# Patient Record
Sex: MALE | Race: WHITE | NOT HISPANIC OR LATINO | Employment: UNEMPLOYED | URBAN - NONMETROPOLITAN AREA
[De-identification: names, ages, dates, MRNs, and addresses within clinical notes are randomized per-mention and may not be internally consistent; named-entity substitution may affect disease eponyms.]

---

## 2022-08-20 ENCOUNTER — HOSPITAL ENCOUNTER (EMERGENCY)
Facility: HOSPITAL | Age: 57
Discharge: HOME | End: 2022-08-20
Attending: EMERGENCY MEDICINE | Admitting: EMERGENCY MEDICINE

## 2022-08-20 VITALS
TEMPERATURE: 98 F | WEIGHT: 165 LBS | RESPIRATION RATE: 16 BRPM | SYSTOLIC BLOOD PRESSURE: 108 MMHG | HEIGHT: 68 IN | HEART RATE: 68 BPM | DIASTOLIC BLOOD PRESSURE: 82 MMHG | OXYGEN SATURATION: 100 % | BODY MASS INDEX: 25.01 KG/M2

## 2022-08-20 DIAGNOSIS — F10.10 ALCOHOL ABUSE: Primary | ICD-10-CM

## 2022-08-20 LAB
ALBUMIN SERPL-MCNC: 3.2 G/DL (ref 3.5–5.2)
ALBUMIN/GLOB SERPL: 1.5 G/DL
ALP SERPL-CCNC: 203 U/L (ref 39–117)
ALT SERPL W P-5'-P-CCNC: 43 U/L (ref 1–41)
AMPHET+METHAMPHET UR QL: NEGATIVE
AMPHETAMINES UR QL: NEGATIVE
ANION GAP SERPL CALCULATED.3IONS-SCNC: 11.3 MMOL/L (ref 5–15)
AST SERPL-CCNC: 48 U/L (ref 1–40)
BACTERIA UR QL AUTO: ABNORMAL /HPF
BARBITURATES UR QL SCN: NEGATIVE
BASOPHILS # BLD AUTO: 0.1 10*3/MM3 (ref 0–0.2)
BASOPHILS NFR BLD AUTO: 0.9 % (ref 0–1.5)
BENZODIAZ UR QL SCN: POSITIVE
BILIRUB SERPL-MCNC: 0.6 MG/DL (ref 0–1.2)
BILIRUB UR QL STRIP: ABNORMAL
BUN SERPL-MCNC: 4 MG/DL (ref 6–20)
BUN/CREAT SERPL: 5 (ref 7–25)
BUPRENORPHINE SERPL-MCNC: NEGATIVE NG/ML
CALCIUM SPEC-SCNC: 8.6 MG/DL (ref 8.6–10.5)
CANNABINOIDS SERPL QL: POSITIVE
CHLORIDE SERPL-SCNC: 101 MMOL/L (ref 98–107)
CLARITY UR: ABNORMAL
CO2 SERPL-SCNC: 23.7 MMOL/L (ref 22–29)
COCAINE UR QL: NEGATIVE
COLOR UR: ABNORMAL
CREAT SERPL-MCNC: 0.8 MG/DL (ref 0.76–1.27)
DEPRECATED RDW RBC AUTO: 55.6 FL (ref 37–54)
EGFRCR SERPLBLD CKD-EPI 2021: 103.9 ML/MIN/1.73
EOSINOPHIL # BLD AUTO: 0.31 10*3/MM3 (ref 0–0.4)
EOSINOPHIL NFR BLD AUTO: 2.9 % (ref 0.3–6.2)
ERYTHROCYTE [DISTWIDTH] IN BLOOD BY AUTOMATED COUNT: 14.7 % (ref 12.3–15.4)
ETHANOL BLD-MCNC: <10 MG/DL (ref 0–10)
ETHANOL UR QL: <0.01 %
FLUAV RNA RESP QL NAA+PROBE: NOT DETECTED
FLUBV RNA RESP QL NAA+PROBE: NOT DETECTED
GLOBULIN UR ELPH-MCNC: 2.2 GM/DL
GLUCOSE SERPL-MCNC: 90 MG/DL (ref 65–99)
GLUCOSE UR STRIP-MCNC: NEGATIVE MG/DL
HCT VFR BLD AUTO: 43.8 % (ref 37.5–51)
HGB BLD-MCNC: 15 G/DL (ref 13–17.7)
HGB UR QL STRIP.AUTO: NEGATIVE
HOLD SPECIMEN: NORMAL
HOLD SPECIMEN: NORMAL
HYALINE CASTS UR QL AUTO: ABNORMAL /LPF
IMM GRANULOCYTES # BLD AUTO: 0.04 10*3/MM3 (ref 0–0.05)
IMM GRANULOCYTES NFR BLD AUTO: 0.4 % (ref 0–0.5)
KETONES UR QL STRIP: ABNORMAL
LEUKOCYTE ESTERASE UR QL STRIP.AUTO: ABNORMAL
LYMPHOCYTES # BLD AUTO: 2.78 10*3/MM3 (ref 0.7–3.1)
LYMPHOCYTES NFR BLD AUTO: 26.3 % (ref 19.6–45.3)
MAGNESIUM SERPL-MCNC: 1.6 MG/DL (ref 1.6–2.6)
MCH RBC QN AUTO: 34.6 PG (ref 26.6–33)
MCHC RBC AUTO-ENTMCNC: 34.2 G/DL (ref 31.5–35.7)
MCV RBC AUTO: 101.2 FL (ref 79–97)
METHADONE UR QL SCN: NEGATIVE
MONOCYTES # BLD AUTO: 0.73 10*3/MM3 (ref 0.1–0.9)
MONOCYTES NFR BLD AUTO: 6.9 % (ref 5–12)
NEUTROPHILS NFR BLD AUTO: 6.62 10*3/MM3 (ref 1.7–7)
NEUTROPHILS NFR BLD AUTO: 62.6 % (ref 42.7–76)
NITRITE UR QL STRIP: NEGATIVE
NRBC BLD AUTO-RTO: 0 /100 WBC (ref 0–0.2)
OPIATES UR QL: NEGATIVE
OXYCODONE UR QL SCN: NEGATIVE
PCP UR QL SCN: NEGATIVE
PH UR STRIP.AUTO: 6.5 [PH] (ref 5–8)
PLATELET # BLD AUTO: 264 10*3/MM3 (ref 140–450)
PMV BLD AUTO: 8.9 FL (ref 6–12)
POTASSIUM SERPL-SCNC: 3.6 MMOL/L (ref 3.5–5.2)
PROPOXYPH UR QL: NEGATIVE
PROT SERPL-MCNC: 5.4 G/DL (ref 6–8.5)
PROT UR QL STRIP: ABNORMAL
QT INTERVAL: 380 MS
QTC INTERVAL: 472 MS
RBC # BLD AUTO: 4.33 10*6/MM3 (ref 4.14–5.8)
RBC # UR STRIP: ABNORMAL /HPF
REF LAB TEST METHOD: ABNORMAL
SARS-COV-2 RNA RESP QL NAA+PROBE: NOT DETECTED
SODIUM SERPL-SCNC: 136 MMOL/L (ref 136–145)
SP GR UR STRIP: 1.01 (ref 1–1.03)
SQUAMOUS #/AREA URNS HPF: ABNORMAL /HPF
TRICYCLICS UR QL SCN: NEGATIVE
UROBILINOGEN UR QL STRIP: ABNORMAL
WBC # UR STRIP: ABNORMAL /HPF
WBC NRBC COR # BLD: 10.58 10*3/MM3 (ref 3.4–10.8)
WHOLE BLOOD HOLD COAG: NORMAL
WHOLE BLOOD HOLD SPECIMEN: NORMAL

## 2022-08-20 PROCEDURE — 81001 URINALYSIS AUTO W/SCOPE: CPT | Performed by: PHYSICIAN ASSISTANT

## 2022-08-20 PROCEDURE — 83735 ASSAY OF MAGNESIUM: CPT | Performed by: PHYSICIAN ASSISTANT

## 2022-08-20 PROCEDURE — 82077 ASSAY SPEC XCP UR&BREATH IA: CPT | Performed by: PHYSICIAN ASSISTANT

## 2022-08-20 PROCEDURE — 87636 SARSCOV2 & INF A&B AMP PRB: CPT | Performed by: PHYSICIAN ASSISTANT

## 2022-08-20 PROCEDURE — 80306 DRUG TEST PRSMV INSTRMNT: CPT | Performed by: PHYSICIAN ASSISTANT

## 2022-08-20 PROCEDURE — 93005 ELECTROCARDIOGRAM TRACING: CPT | Performed by: PHYSICIAN ASSISTANT

## 2022-08-20 PROCEDURE — C9803 HOPD COVID-19 SPEC COLLECT: HCPCS | Performed by: PHYSICIAN ASSISTANT

## 2022-08-20 PROCEDURE — 36415 COLL VENOUS BLD VENIPUNCTURE: CPT

## 2022-08-20 PROCEDURE — 99284 EMERGENCY DEPT VISIT MOD MDM: CPT

## 2022-08-20 PROCEDURE — 85025 COMPLETE CBC W/AUTO DIFF WBC: CPT | Performed by: PHYSICIAN ASSISTANT

## 2022-08-20 PROCEDURE — 80053 COMPREHEN METABOLIC PANEL: CPT | Performed by: PHYSICIAN ASSISTANT

## 2022-08-20 NOTE — NURSING NOTE
Assessment complete. History reviewed. Pt reports 3 month drinking binge, due to high stress situations. Pt had 4 years of sobriety prior to this relapse. CIWA 7 at this time. Pt reports a hx of DT during prior detox. Pt expressed depression 4/10 and anxiety 7/10. Denies SI/HI/AVH.    1.56

## 2022-08-20 NOTE — NURSING NOTE
Pt arrived to intake. Search complete with 2 staff members present. Pt educated on mask wearing policy. Belongings placed in labeled bag and placed in cabinet for safety.

## 2022-08-20 NOTE — ED PROVIDER NOTES
Subjective   56-year-old male who presents to the ED today for detox evaluation.  He states that he has been drinking alcohol heavily for the last 3 months.  He states prior to that he was sober for about 4 years.  He states his living situation and several deaths in the family triggered his relapse.  He reports that he has not been eating very much due to his alcohol binge.  He states he has been drinking up to 5 bottles of wine plus extra alcohol every day.  He states his last drink was yesterday morning.  He states currently he feels anxious but denies any other withdrawal symptoms.  He denies any drug use except some occasional marijuana.  He denies any suicidal ideations.      History provided by:  Patient  Drug / Alcohol Assessment  Primary symptoms comment: requesting detox. This is a new problem. The current episode started yesterday. The problem has been gradually worsening. Suspected agents include alcohol. Pertinent negatives include no nausea and no vomiting. Associated medical issues include addiction treatment.       Review of Systems   Constitutional: Positive for appetite change.   HENT: Negative.    Eyes: Negative.    Respiratory: Negative.    Cardiovascular: Negative.    Gastrointestinal: Negative for nausea and vomiting.   Genitourinary: Negative.    Musculoskeletal: Negative.    Skin: Negative.    Neurological: Negative.    Psychiatric/Behavioral: Negative for suicidal ideas. The patient is nervous/anxious.    All other systems reviewed and are negative.      Past Medical History:   Diagnosis Date   • Alcohol abuse    • Anxiety        No Known Allergies    History reviewed. No pertinent surgical history.    Family History   Family history unknown: Yes       Social History     Socioeconomic History   • Marital status: Single   Tobacco Use   • Smoking status: Current Every Day Smoker     Packs/day: 0.50     Types: Cigarettes   • Smokeless tobacco: Never Used   Substance and Sexual Activity   •  Alcohol use: Yes     Comment: 4-5 bottles of wine   • Sexual activity: Not Currently           Objective   Physical Exam  Vitals and nursing note reviewed.   Constitutional:       General: He is not in acute distress.     Appearance: Normal appearance. He is not diaphoretic.   HENT:      Head: Normocephalic and atraumatic.      Right Ear: External ear normal.      Left Ear: External ear normal.   Eyes:      Conjunctiva/sclera: Conjunctivae normal.      Pupils: Pupils are equal, round, and reactive to light.   Cardiovascular:      Rate and Rhythm: Normal rate and regular rhythm.      Pulses: Normal pulses.      Heart sounds: Normal heart sounds.   Pulmonary:      Effort: Pulmonary effort is normal.      Breath sounds: Normal breath sounds.   Abdominal:      General: Bowel sounds are normal.      Palpations: Abdomen is soft.   Musculoskeletal:         General: Normal range of motion.      Cervical back: Normal range of motion and neck supple.   Skin:     General: Skin is warm and dry.      Capillary Refill: Capillary refill takes less than 2 seconds.   Neurological:      General: No focal deficit present.      Mental Status: He is alert and oriented to person, place, and time.   Psychiatric:         Mood and Affect: Mood is anxious.         Speech: Speech normal.         Behavior: Behavior normal. Behavior is cooperative.         Thought Content: Thought content does not include homicidal or suicidal ideation.         Procedures           ED Course  ED Course as of 08/20/22 1531   Sat Aug 20, 2022   1333 Medically clear for detox [AH]   1335 ECG 12 Lead  Vent. Rate :  93 BPM     Atrial Rate :  93 BPM     P-R Int : 134 ms          QRS Dur :  86 ms      QT Int : 380 ms       P-R-T Axes :  36  28  50 degrees     QTc Int : 472 ms     Sinus rhythm with premature atrial complexes with aberrant conduction  Otherwise normal ECG  No previous ECGs available [ES]   1435 Dr. Simpson advised the patient does not meet inpatient  criteria at this time.  The patient will be discharged to follow-up outpatient. [AH]      ED Course User Index  [AH] Leatha Scott PA  [ES] Kj Rosa MD                                           MDM  Number of Diagnoses or Management Options     Amount and/or Complexity of Data Reviewed  Clinical lab tests: reviewed  Tests in the medicine section of CPT®: reviewed    Patient Progress  Patient progress: stable      Final diagnoses:   Alcohol abuse       ED Disposition  ED Disposition     ED Disposition   Discharge    Condition   Stable    Comment   --             PATIENT CONNECTION - [place]  See Provider List  [facility] [id number]  [phone]  Schedule an appointment as soon as possible for a visit in 2 days           Medication List      No changes were made to your prescriptions during this visit.          Leatha Scott PA  08/20/22 1536

## 2022-08-20 NOTE — NURSING NOTE
Spoke with Dr. Simpson regarding pt. Reviewed history, CIWA, labs, and vitals. Dr. Simpson does not recommend admission at this time. Advises to d/c with instructions to return of condition worsens.

## 2023-06-11 ENCOUNTER — HOSPITAL ENCOUNTER (EMERGENCY)
Facility: HOSPITAL | Age: 58
Discharge: PSYCHIATRIC OR SUBSTANCE USE FACILITY | End: 2023-06-11
Attending: STUDENT IN AN ORGANIZED HEALTH CARE EDUCATION/TRAINING PROGRAM | Admitting: STUDENT IN AN ORGANIZED HEALTH CARE EDUCATION/TRAINING PROGRAM
Payer: MEDICAID

## 2023-06-11 ENCOUNTER — HOSPITAL ENCOUNTER (INPATIENT)
Facility: HOSPITAL | Age: 58
LOS: 5 days | Discharge: HOME | End: 2023-06-16
Attending: PSYCHIATRY & NEUROLOGY | Admitting: PSYCHIATRY & NEUROLOGY
Payer: MEDICAID

## 2023-06-11 VITALS
BODY MASS INDEX: 25.76 KG/M2 | HEIGHT: 68 IN | RESPIRATION RATE: 18 BRPM | DIASTOLIC BLOOD PRESSURE: 79 MMHG | SYSTOLIC BLOOD PRESSURE: 111 MMHG | TEMPERATURE: 98 F | OXYGEN SATURATION: 96 % | WEIGHT: 170 LBS | HEART RATE: 107 BPM

## 2023-06-11 DIAGNOSIS — F10.10 ETOH ABUSE: Primary | ICD-10-CM

## 2023-06-11 LAB
ALBUMIN SERPL-MCNC: 4.2 G/DL (ref 3.5–5.2)
ALBUMIN/GLOB SERPL: 1.7 G/DL
ALP SERPL-CCNC: 109 U/L (ref 39–117)
ALT SERPL W P-5'-P-CCNC: 64 U/L (ref 1–41)
AMPHET+METHAMPHET UR QL: NEGATIVE
AMPHETAMINES UR QL: NEGATIVE
ANION GAP SERPL CALCULATED.3IONS-SCNC: 19.5 MMOL/L (ref 5–15)
AST SERPL-CCNC: 67 U/L (ref 1–40)
BACTERIA UR QL AUTO: ABNORMAL /HPF
BARBITURATES UR QL SCN: NEGATIVE
BASOPHILS # BLD AUTO: 0.04 10*3/MM3 (ref 0–0.2)
BASOPHILS NFR BLD AUTO: 0.6 % (ref 0–1.5)
BENZODIAZ UR QL SCN: NEGATIVE
BILIRUB SERPL-MCNC: 0.5 MG/DL (ref 0–1.2)
BILIRUB UR QL STRIP: NEGATIVE
BUN SERPL-MCNC: 2 MG/DL (ref 6–20)
BUN/CREAT SERPL: 2.9 (ref 7–25)
BUPRENORPHINE SERPL-MCNC: NEGATIVE NG/ML
CALCIUM SPEC-SCNC: 9 MG/DL (ref 8.6–10.5)
CANNABINOIDS SERPL QL: NEGATIVE
CHLORIDE SERPL-SCNC: 94 MMOL/L (ref 98–107)
CLARITY UR: ABNORMAL
CO2 SERPL-SCNC: 21.5 MMOL/L (ref 22–29)
COCAINE UR QL: NEGATIVE
COLOR UR: YELLOW
CREAT SERPL-MCNC: 0.7 MG/DL (ref 0.76–1.27)
DEPRECATED RDW RBC AUTO: 43.8 FL (ref 37–54)
EGFRCR SERPLBLD CKD-EPI 2021: 107.5 ML/MIN/1.73
EOSINOPHIL # BLD AUTO: 0.11 10*3/MM3 (ref 0–0.4)
EOSINOPHIL NFR BLD AUTO: 1.5 % (ref 0.3–6.2)
ERYTHROCYTE [DISTWIDTH] IN BLOOD BY AUTOMATED COUNT: 14 % (ref 12.3–15.4)
ETHANOL BLD-MCNC: 113 MG/DL (ref 0–10)
ETHANOL BLD-MCNC: 69 MG/DL (ref 0–10)
ETHANOL UR QL: 0.07 %
ETHANOL UR QL: 0.11 %
FLUAV RNA RESP QL NAA+PROBE: NOT DETECTED
FLUBV RNA RESP QL NAA+PROBE: NOT DETECTED
GLOBULIN UR ELPH-MCNC: 2.5 GM/DL
GLUCOSE SERPL-MCNC: 110 MG/DL (ref 65–99)
GLUCOSE UR STRIP-MCNC: NEGATIVE MG/DL
HCT VFR BLD AUTO: 46.3 % (ref 37.5–51)
HGB BLD-MCNC: 16.3 G/DL (ref 13–17.7)
HGB UR QL STRIP.AUTO: ABNORMAL
HYALINE CASTS UR QL AUTO: ABNORMAL /LPF
IMM GRANULOCYTES # BLD AUTO: 0.02 10*3/MM3 (ref 0–0.05)
IMM GRANULOCYTES NFR BLD AUTO: 0.3 % (ref 0–0.5)
KETONES UR QL STRIP: NEGATIVE
LEUKOCYTE ESTERASE UR QL STRIP.AUTO: NEGATIVE
LYMPHOCYTES # BLD AUTO: 1.67 10*3/MM3 (ref 0.7–3.1)
LYMPHOCYTES NFR BLD AUTO: 23 % (ref 19.6–45.3)
MAGNESIUM SERPL-MCNC: 2.1 MG/DL (ref 1.6–2.6)
MCH RBC QN AUTO: 30.7 PG (ref 26.6–33)
MCHC RBC AUTO-ENTMCNC: 35.2 G/DL (ref 31.5–35.7)
MCV RBC AUTO: 87.2 FL (ref 79–97)
METHADONE UR QL SCN: NEGATIVE
MONOCYTES # BLD AUTO: 0.98 10*3/MM3 (ref 0.1–0.9)
MONOCYTES NFR BLD AUTO: 13.5 % (ref 5–12)
NEUTROPHILS NFR BLD AUTO: 4.45 10*3/MM3 (ref 1.7–7)
NEUTROPHILS NFR BLD AUTO: 61.1 % (ref 42.7–76)
NITRITE UR QL STRIP: NEGATIVE
NRBC BLD AUTO-RTO: 0 /100 WBC (ref 0–0.2)
OPIATES UR QL: NEGATIVE
OXYCODONE UR QL SCN: NEGATIVE
PCP UR QL SCN: NEGATIVE
PH UR STRIP.AUTO: 6.5 [PH] (ref 5–8)
PLATELET # BLD AUTO: 168 10*3/MM3 (ref 140–450)
PMV BLD AUTO: 8.8 FL (ref 6–12)
POTASSIUM SERPL-SCNC: 3.5 MMOL/L (ref 3.5–5.2)
PROPOXYPH UR QL: NEGATIVE
PROT SERPL-MCNC: 6.7 G/DL (ref 6–8.5)
PROT UR QL STRIP: NEGATIVE
RBC # BLD AUTO: 5.31 10*6/MM3 (ref 4.14–5.8)
RBC # UR STRIP: ABNORMAL /HPF
REF LAB TEST METHOD: ABNORMAL
SARS-COV-2 RNA RESP QL NAA+PROBE: NOT DETECTED
SODIUM SERPL-SCNC: 135 MMOL/L (ref 136–145)
SP GR UR STRIP: 1.01 (ref 1–1.03)
SQUAMOUS #/AREA URNS HPF: ABNORMAL /HPF
TRICYCLICS UR QL SCN: NEGATIVE
UROBILINOGEN UR QL STRIP: ABNORMAL
WBC # UR STRIP: ABNORMAL /HPF
WBC NRBC COR # BLD: 7.27 10*3/MM3 (ref 3.4–10.8)

## 2023-06-11 PROCEDURE — 93005 ELECTROCARDIOGRAM TRACING: CPT | Performed by: PSYCHIATRY & NEUROLOGY

## 2023-06-11 PROCEDURE — 82077 ASSAY SPEC XCP UR&BREATH IA: CPT | Performed by: PHYSICIAN ASSISTANT

## 2023-06-11 PROCEDURE — 99285 EMERGENCY DEPT VISIT HI MDM: CPT

## 2023-06-11 PROCEDURE — 80306 DRUG TEST PRSMV INSTRMNT: CPT | Performed by: PHYSICIAN ASSISTANT

## 2023-06-11 PROCEDURE — 85025 COMPLETE CBC W/AUTO DIFF WBC: CPT | Performed by: PHYSICIAN ASSISTANT

## 2023-06-11 PROCEDURE — 87636 SARSCOV2 & INF A&B AMP PRB: CPT | Performed by: PHYSICIAN ASSISTANT

## 2023-06-11 PROCEDURE — 83735 ASSAY OF MAGNESIUM: CPT | Performed by: PHYSICIAN ASSISTANT

## 2023-06-11 PROCEDURE — 81001 URINALYSIS AUTO W/SCOPE: CPT | Performed by: PHYSICIAN ASSISTANT

## 2023-06-11 PROCEDURE — 36415 COLL VENOUS BLD VENIPUNCTURE: CPT

## 2023-06-11 PROCEDURE — 80053 COMPREHEN METABOLIC PANEL: CPT | Performed by: PHYSICIAN ASSISTANT

## 2023-06-11 RX ORDER — LORAZEPAM 2 MG/1
2 TABLET ORAL
Status: DISCONTINUED | OUTPATIENT
Start: 2023-06-11 | End: 2023-06-16 | Stop reason: HOSPADM

## 2023-06-11 RX ORDER — MULTIPLE VITAMINS W/ MINERALS TAB 9MG-400MCG
1 TAB ORAL DAILY
Status: DISCONTINUED | OUTPATIENT
Start: 2023-06-11 | End: 2023-06-16 | Stop reason: HOSPADM

## 2023-06-11 RX ORDER — ECHINACEA PURPUREA EXTRACT 125 MG
2 TABLET ORAL AS NEEDED
Status: DISCONTINUED | OUTPATIENT
Start: 2023-06-11 | End: 2023-06-16 | Stop reason: HOSPADM

## 2023-06-11 RX ORDER — BENZONATATE 100 MG/1
100 CAPSULE ORAL 3 TIMES DAILY PRN
Status: DISCONTINUED | OUTPATIENT
Start: 2023-06-11 | End: 2023-06-16 | Stop reason: HOSPADM

## 2023-06-11 RX ORDER — ONDANSETRON 4 MG/1
4 TABLET, FILM COATED ORAL EVERY 6 HOURS PRN
Status: DISCONTINUED | OUTPATIENT
Start: 2023-06-11 | End: 2023-06-16 | Stop reason: HOSPADM

## 2023-06-11 RX ORDER — LORAZEPAM 1 MG/1
1 TABLET ORAL
Status: COMPLETED | OUTPATIENT
Start: 2023-06-14 | End: 2023-06-14

## 2023-06-11 RX ORDER — LORAZEPAM 2 MG/1
2 TABLET ORAL
Status: COMPLETED | OUTPATIENT
Start: 2023-06-12 | End: 2023-06-12

## 2023-06-11 RX ORDER — SODIUM CHLORIDE 0.9 % (FLUSH) 0.9 %
10 SYRINGE (ML) INJECTION AS NEEDED
Status: DISCONTINUED | OUTPATIENT
Start: 2023-06-11 | End: 2023-06-11 | Stop reason: HOSPADM

## 2023-06-11 RX ORDER — ALUMINA, MAGNESIA, AND SIMETHICONE 2400; 2400; 240 MG/30ML; MG/30ML; MG/30ML
15 SUSPENSION ORAL EVERY 6 HOURS PRN
Status: DISCONTINUED | OUTPATIENT
Start: 2023-06-11 | End: 2023-06-16 | Stop reason: HOSPADM

## 2023-06-11 RX ORDER — LORAZEPAM 2 MG/1
2 TABLET ORAL
Status: DISCONTINUED | OUTPATIENT
Start: 2023-06-11 | End: 2023-06-11

## 2023-06-11 RX ORDER — LORAZEPAM 0.5 MG/1
0.5 TABLET ORAL
Status: COMPLETED | OUTPATIENT
Start: 2023-06-15 | End: 2023-06-15

## 2023-06-11 RX ORDER — FAMOTIDINE 20 MG/1
20 TABLET, FILM COATED ORAL 2 TIMES DAILY PRN
Status: DISCONTINUED | OUTPATIENT
Start: 2023-06-11 | End: 2023-06-16 | Stop reason: HOSPADM

## 2023-06-11 RX ORDER — LORAZEPAM 2 MG/ML
2 INJECTION INTRAMUSCULAR ONCE AS NEEDED
Status: DISCONTINUED | OUTPATIENT
Start: 2023-06-11 | End: 2023-06-16 | Stop reason: HOSPADM

## 2023-06-11 RX ORDER — MULTIVITAMIN WITH IRON
2 TABLET ORAL DAILY
Status: DISCONTINUED | OUTPATIENT
Start: 2023-06-11 | End: 2023-06-16 | Stop reason: HOSPADM

## 2023-06-11 RX ORDER — IBUPROFEN 400 MG/1
400 TABLET ORAL EVERY 6 HOURS PRN
Status: DISCONTINUED | OUTPATIENT
Start: 2023-06-11 | End: 2023-06-16 | Stop reason: HOSPADM

## 2023-06-11 RX ORDER — BENZTROPINE MESYLATE 1 MG/1
2 TABLET ORAL ONCE AS NEEDED
Status: DISCONTINUED | OUTPATIENT
Start: 2023-06-11 | End: 2023-06-16 | Stop reason: HOSPADM

## 2023-06-11 RX ORDER — LORAZEPAM 2 MG/1
2 TABLET ORAL ONCE
Status: COMPLETED | OUTPATIENT
Start: 2023-06-11 | End: 2023-06-11

## 2023-06-11 RX ORDER — LORAZEPAM 2 MG/1
2 TABLET ORAL CONTINUOUS PRN
Status: DISCONTINUED | OUTPATIENT
Start: 2023-06-11 | End: 2023-06-11

## 2023-06-11 RX ORDER — HYDROXYZINE 50 MG/1
50 TABLET, FILM COATED ORAL EVERY 6 HOURS PRN
Status: DISCONTINUED | OUTPATIENT
Start: 2023-06-11 | End: 2023-06-16 | Stop reason: HOSPADM

## 2023-06-11 RX ORDER — BENZTROPINE MESYLATE 1 MG/ML
1 INJECTION INTRAMUSCULAR; INTRAVENOUS ONCE AS NEEDED
Status: DISCONTINUED | OUTPATIENT
Start: 2023-06-11 | End: 2023-06-16 | Stop reason: HOSPADM

## 2023-06-11 RX ORDER — LOPERAMIDE HYDROCHLORIDE 2 MG/1
2 CAPSULE ORAL
Status: DISCONTINUED | OUTPATIENT
Start: 2023-06-11 | End: 2023-06-16 | Stop reason: HOSPADM

## 2023-06-11 RX ORDER — TRAZODONE HYDROCHLORIDE 50 MG/1
50 TABLET ORAL NIGHTLY PRN
Status: DISCONTINUED | OUTPATIENT
Start: 2023-06-11 | End: 2023-06-16 | Stop reason: HOSPADM

## 2023-06-11 RX ORDER — LORAZEPAM 1 MG/1
1 TABLET ORAL EVERY 4 HOURS PRN
Status: ACTIVE | OUTPATIENT
Start: 2023-06-14 | End: 2023-06-15

## 2023-06-11 RX ORDER — LORAZEPAM 0.5 MG/1
0.5 TABLET ORAL EVERY 4 HOURS PRN
Status: ACTIVE | OUTPATIENT
Start: 2023-06-15 | End: 2023-06-16

## 2023-06-11 RX ORDER — LORAZEPAM 2 MG/1
2 TABLET ORAL EVERY 4 HOURS PRN
Status: ACTIVE | OUTPATIENT
Start: 2023-06-12 | End: 2023-06-13

## 2023-06-11 RX ORDER — NICOTINE 21 MG/24HR
1 PATCH, TRANSDERMAL 24 HOURS TRANSDERMAL
Status: DISCONTINUED | OUTPATIENT
Start: 2023-06-11 | End: 2023-06-16 | Stop reason: HOSPADM

## 2023-06-11 RX ADMIN — LORAZEPAM 2 MG: 2 TABLET ORAL at 22:21

## 2023-06-11 RX ADMIN — LORAZEPAM 2 MG: 2 TABLET ORAL at 20:07

## 2023-06-11 RX ADMIN — Medication 1 TABLET: at 20:09

## 2023-06-11 RX ADMIN — LORAZEPAM 2 MG: 2 TABLET ORAL at 16:36

## 2023-06-11 RX ADMIN — LORAZEPAM 2 MG: 2 TABLET ORAL at 23:53

## 2023-06-11 RX ADMIN — LORAZEPAM 2 MG: 2 TABLET ORAL at 21:17

## 2023-06-11 RX ADMIN — Medication 2 TABLET: at 20:08

## 2023-06-11 RX ADMIN — Medication 100 MG: at 20:08

## 2023-06-11 RX ADMIN — NICOTINE TRANSDERMAL SYSTEM 1 PATCH: 21 PATCH, EXTENDED RELEASE TRANSDERMAL at 20:08

## 2023-06-11 RX ADMIN — SODIUM CHLORIDE 1000 ML: 9 INJECTION, SOLUTION INTRAVENOUS at 13:12

## 2023-06-11 RX ADMIN — HYDROXYZINE HYDROCHLORIDE 50 MG: 50 TABLET ORAL at 20:07

## 2023-06-11 RX ADMIN — LORAZEPAM 2 MG: 2 TABLET ORAL at 21:47

## 2023-06-11 NOTE — NURSING NOTE
Spoke to doctor Ardon intake information labs and V/S provided and discussed with provider, instructed to admit with routine SP4 orders, Ativan detox orders, Ativan protocol med to every 1 hour as needed, Ativan 2mg IV PRN for seizures as needed, repeat in 1 minute if seizures do not cease, if IV access unobtainable give IM [id number]. Patient and ER provider made aware of admitting orders and plan of care.

## 2023-06-11 NOTE — NURSING NOTE
Pt presents for alcohol detox, two fifths of liquor daily along with beer and wine, for the last month, last drink was today. States relapsed 1 month ago      Pt denies SI HI AVH.    Reports seizure approx. 3 years ago.    CIWA-8    Craving-0/10    Pt plans to return to [facility] after detox.    Reports poor sleep and appetite.    Depression-9/10  Anxiety-9/10    Denies any other substance or alcohol abuse.

## 2023-06-11 NOTE — ED PROVIDER NOTES
Subjective   History of Present Illness  57-year-old male that presents to the emergency department chief complaint alcohol abuse.  Patient does state he drank 1 or 2 beers prior to arrival.  Patient does state he drinks daily.  Does wish to go through medical detox.  Patient denies any suicidal homicidal ideation at this time.    History provided by:  Patient   used: No    Addiction Problem  Location:  Alcohol  Severity:  Moderate  Onset quality:  Gradual  Duration:  2 days  Timing:  Intermittent  Progression:  Worsening  Chronicity:  New  Associated symptoms: no chest pain, no cough, no diarrhea, no ear pain, no fever, no headaches, no myalgias, no rash, no rhinorrhea, no shortness of breath, no sore throat, no vomiting and no wheezing      Review of Systems   Constitutional: Negative.  Negative for appetite change and fever.   HENT: Negative.  Negative for ear pain, rhinorrhea and sore throat.    Eyes: Negative.  Negative for photophobia, pain and itching.   Respiratory:  Negative for cough, shortness of breath and wheezing.    Cardiovascular: Negative.  Negative for chest pain.   Gastrointestinal: Negative.  Negative for diarrhea and vomiting.   Endocrine: Negative.  Negative for heat intolerance, polydipsia and polyuria.   Genitourinary: Negative.  Negative for dysuria, frequency, genital sores and hematuria.   Musculoskeletal: Negative.  Negative for myalgias.   Skin: Negative.  Negative for pallor, rash and wound.   Neurological:  Negative for headaches.   Hematological: Negative.    Psychiatric/Behavioral: Negative.  Negative for confusion, decreased concentration, dysphoric mood and hallucinations. The patient is not nervous/anxious.    All other systems reviewed and are negative.    Past Medical History:   Diagnosis Date    Alcohol abuse     Anxiety        No Known Allergies    No past surgical history on file.    Family History   Family history unknown: Yes       Social History      Socioeconomic History    Marital status: Single   Tobacco Use    Smoking status: Every Day     Packs/day: 0.50     Types: Cigarettes    Smokeless tobacco: Never   Substance and Sexual Activity    Alcohol use: Yes     Comment: 4-5 bottles of wine    Sexual activity: Not Currently           Objective   Physical Exam  Vitals reviewed.   Constitutional:       General: He is not in acute distress.     Appearance: Normal appearance. He is normal weight. He is not ill-appearing, toxic-appearing or diaphoretic.   HENT:      Head: Normocephalic and atraumatic.      Right Ear: Tympanic membrane, ear canal and external ear normal. There is no impacted cerumen.      Left Ear: Tympanic membrane, ear canal and external ear normal. There is no impacted cerumen.      Nose: Nose normal. No congestion or rhinorrhea.      Mouth/Throat:      Mouth: Mucous membranes are moist.      Pharynx: Oropharynx is clear. No oropharyngeal exudate or posterior oropharyngeal erythema.   Eyes:      General: No scleral icterus.        Right eye: No discharge.         Left eye: No discharge.      Extraocular Movements: Extraocular movements intact.      Conjunctiva/sclera: Conjunctivae normal.      Pupils: Pupils are equal, round, and reactive to light.   Neck:      Vascular: No carotid bruit.   Cardiovascular:      Rate and Rhythm: Normal rate and regular rhythm.      Pulses: Normal pulses.      Heart sounds: Normal heart sounds. No murmur heard.    No friction rub. No gallop.   Pulmonary:      Effort: Pulmonary effort is normal. No respiratory distress.      Breath sounds: Normal breath sounds. No stridor. No wheezing or rales.   Chest:      Chest wall: No tenderness.   Abdominal:      General: Abdomen is flat. Bowel sounds are normal. There is no distension.      Palpations: Abdomen is soft. There is no mass.      Tenderness: There is no abdominal tenderness. There is no right CVA tenderness, guarding or rebound.      Hernia: No hernia is  present.   Musculoskeletal:         General: No swelling, tenderness, deformity or signs of injury. Normal range of motion.      Cervical back: Normal range of motion and neck supple. No rigidity or tenderness.      Right lower leg: No edema.      Left lower leg: No edema.   Lymphadenopathy:      Cervical: No cervical adenopathy.   Skin:     General: Skin is warm and dry.      Capillary Refill: Capillary refill takes less than 2 seconds.      Coloration: Skin is not jaundiced or pale.      Findings: No bruising, erythema or lesion.   Neurological:      General: No focal deficit present.      Mental Status: He is alert and oriented to person, place, and time. Mental status is at baseline.      Cranial Nerves: No cranial nerve deficit.      Sensory: No sensory deficit.      Motor: No weakness.      Coordination: Coordination normal.      Gait: Gait normal.   Psychiatric:         Mood and Affect: Mood normal.         Behavior: Behavior normal.         Thought Content: Thought content normal.         Judgment: Judgment normal.       Procedures           ED Course                                           Medical Decision Making  Problems Addressed:  ETOH abuse: complicated acute illness or injury    Amount and/or Complexity of Data Reviewed  Labs: ordered.    Risk  Prescription drug management.        Final diagnoses:   ETOH abuse       ED Disposition  ED Disposition       ED Disposition   DC/Transfer to Behavioral Health Condition   Stable    Comment   --               No follow-up provider specified.       Medication List      No changes were made to your prescriptions during this visit.            Kendall Griffin PA-C  06/11/23 6472

## 2023-06-12 PROBLEM — F10.939 ALCOHOL WITHDRAWAL: Status: ACTIVE | Noted: 2023-06-12

## 2023-06-12 PROBLEM — F10.20 ALCOHOL USE DISORDER, SEVERE, DEPENDENCE: Status: ACTIVE | Noted: 2023-06-11

## 2023-06-12 LAB
HAV IGM SERPL QL IA: NORMAL
HBV CORE IGM SERPL QL IA: NORMAL
HBV SURFACE AG SERPL QL IA: NORMAL
HCV AB SER DONR QL: NORMAL
QT INTERVAL: 350 MS
QTC INTERVAL: 453 MS

## 2023-06-12 PROCEDURE — 99222 1ST HOSP IP/OBS MODERATE 55: CPT | Performed by: PSYCHIATRY & NEUROLOGY

## 2023-06-12 PROCEDURE — 80074 ACUTE HEPATITIS PANEL: CPT | Performed by: PSYCHIATRY & NEUROLOGY

## 2023-06-12 PROCEDURE — 63710000001 ONDANSETRON PER 8 MG: Performed by: PSYCHIATRY & NEUROLOGY

## 2023-06-12 RX ADMIN — Medication 2 TABLET: at 08:34

## 2023-06-12 RX ADMIN — HYDROXYZINE HYDROCHLORIDE 50 MG: 50 TABLET ORAL at 08:34

## 2023-06-12 RX ADMIN — IBUPROFEN 400 MG: 400 TABLET, FILM COATED ORAL at 08:34

## 2023-06-12 RX ADMIN — NICOTINE TRANSDERMAL SYSTEM 1 PATCH: 21 PATCH, EXTENDED RELEASE TRANSDERMAL at 08:36

## 2023-06-12 RX ADMIN — Medication 1 TABLET: at 08:35

## 2023-06-12 RX ADMIN — LORAZEPAM 2 MG: 2 TABLET ORAL at 21:22

## 2023-06-12 RX ADMIN — ONDANSETRON HYDROCHLORIDE 4 MG: 4 TABLET, FILM COATED ORAL at 08:34

## 2023-06-12 RX ADMIN — LORAZEPAM 2 MG: 2 TABLET ORAL at 08:34

## 2023-06-12 RX ADMIN — LORAZEPAM 2 MG: 2 TABLET ORAL at 14:07

## 2023-06-12 RX ADMIN — Medication 100 MG: at 08:34

## 2023-06-12 NOTE — PLAN OF CARE
Goal Outcome Evaluation:           Problem: Adult Behavioral Health Plan of Care  Goal: Patient-Specific Goal (Individualization)  Outcome: Ongoing, Progressing  Flowsheets  Taken 6/12/2023 1354  Patient-Specific Goals (Include Timeframe): Identify 2-3 coping skills, address relapse prevention methods, complete aftercare plans, and deny SI/HI prior to discharge.  Individualized Care Needs: Therapist to offer 1-4 therapy sessions, aftercare planning, safety planning, family education, group therapy, and brief CBT/MI interventions.  Anxieties, Fears or Concerns: none verbalized  Taken 6/12/2023 0944  Patient Personal Strengths:   motivated for recovery   motivated for treatment   independent living skills   resilient   resourceful   intellectual cognitive skills   positive educational history   spiritual/Islam support   self-awareness   expressive of needs   expressive of emotions  Patient Vulnerabilities:   occupational insecurity   history of unsuccessful treatment   substance abuse/addiction   poor impulse control   adverse childhood experience(s)   family/relationship conflict   limited support system  Goal: Optimized Coping Skills in Response to Life Stressors  Outcome: Ongoing, Progressing  Flowsheets (Taken 6/12/2023 1354)  Optimized Coping Skills in Response to Life Stressors: making progress toward outcome  Intervention: Promote Effective Coping Strategies  Flowsheets (Taken 6/12/2023 1354)  Supportive Measures:   active listening utilized   counseling provided   decision-making supported   goal-setting facilitated   self-reflection promoted   verbalization of feelings encouraged   self-responsibility promoted   positive reinforcement provided   self-care encouraged  Goal: Develops/Participates in Therapeutic [place] to Support Successful Transition  Outcome: Ongoing, Progressing  Flowsheets (Taken 6/12/2023 1354)  Develops/Participates in Therapeutic [place] to Support Successful Transition: making  progress toward outcome  Intervention: Foster Therapeutic [place]  Flowsheets (Taken 6/11/2023 2032 by Shanta Hinds RN)  Trust Relationship/Rapport:   choices provided   care explained   emotional support provided   questions answered   empathic listening provided   questions encouraged   reassurance provided   thoughts/feelings acknowledged  Intervention: Mutually Develop Transition Plan  Flowsheets  Taken 6/12/2023 1354  Outpatient/Agency/Support Group Needs: residential services  Transition Support:   follow-up care discussed   community resources reviewed   crisis management plan verbalized   crisis management plan promoted   follow-up care coordinated  Anticipated Discharge Disposition: residential substance use unit  Taken 6/12/2023 1352  Discharge Coordination/Progress: Patient has [place] [employer]. Therapist met with patient to complete assessment. Patient to return to [facility] when stable, agency to transport.  Transportation Anticipated: agency  Transportation Concerns: none  Current Discharge Risk:   substance use/abuse   lack of support system/caregiver  Concerns to be Addressed:   substance/tobacco abuse/use   discharge planning   coping/stress   mental health   cognitive/perceptual  Readmission Within the Last 30 Days: no previous admission in last 30 days  Patient/Family Anticipated Services at Transition:   rehabilitation services   mental health services  Patient's Choice of Community Agency(s): [facility]  Patient/Family Anticipates Transition to: inpatient rehabilitation facility  Offered/Gave Vendor List: no      DATA:         Therapist met individually with patient this date to introduce role and to discuss hospitalization expectations. Patient agreeable.     Patient signed consent for [facility].     Clinical Maneuvering/Intervention:     Therapist assisted patient in processing above session content; acknowledged and normalized patient’s  thoughts, feelings, and concerns.  Discussed the therapist/patient relationship and explain the parameters and limitations of relative confidentiality.  Also discussed the importance of active participation, and honesty to the treatment process.  Encouraged the patient to discuss/vent their feelings, frustrations, and fears concerning their ongoing medical issues and validated their feelings.     Discussed the importance of finding enjoyable activities and coping skills that the patient can engage in a regular basis. Discussed healthy coping skills such as distraction, self love, grounding, thought challenges/reframing, etc.  Provided patient with list of healthy coping skills this date. Discussed the importance of medication compliance.  Praised the patient for seeking help and spent the majority of the session building rapport.       Allowed patient to freely discuss issues without interruption or judgment. Provided safe, confidential environment to facilitate the development of positive therapeutic relationship and encourage open, honest communication.      Therapist addressed discharge safety planning this date. Assisted patient in identifying risk factors which would indicate the need for higher level of care after discharge;  including thoughts to harm self or others and/or self-harming behavior. Encouraged patient to call 911, or present to the nearest emergency room should any of these events occur. Discussed crisis intervention services and means to access.  Encouraged securing any objects of harm.       Therapist completed integrated summary, treatment plan, and initiated social history this date.  Therapist is strongly encouraging family involvement in treatment.       ASSESSMENT:      The patient is a 58 y/o  male admitted for alcohol detox treatment. Therapist met with patient on this date to complete assessment, patient calm and cooperative. Patient denies current SI/HI/AVH. Patient reports  that he has been drinking heavily for the past 25 years, longest period of sobriety reported to be four years. Patient was brought in by [facility] and is agreeable to return at discharge. Agency will provide transportation. Patient declines family involvement in treatment at this time, reports having no family support.      PLAN:       Patient to remain hospitalized this date.     Treatment team will focus efforts on stabilizing patient's acute symptoms while providing education on healthy coping and crisis management to reduce hospitalizations. Patient requires daily psychiatrist evaluation and 24/7 nursing supervision to promote patient  safety.     Therapist will offer 1-4 individual sessions, 1 therapy group daily, family education, and appropriate referral.    Therapist recommends [facility] residential rehab.

## 2023-06-12 NOTE — NURSING NOTE
EKG showing occasional PVCs. Contacted Dr. Ardon and advised of current CIWA 8 but declining mental status. New order for Ativan 2mg Q30 hold if O2 drops below 93%. TORBV.

## 2023-06-12 NOTE — PROGRESS NOTES
Navigator is helping with the following referrals:    [facility] - [phone]  -Spoke with [facility]. They will accept patient at discharge.  6/12

## 2023-06-12 NOTE — NURSING NOTE
Patient presented to ED from [facility] requesting detox. Pt reports drinking two fifths of liquor a day, alone with beer and wine. Reported sometimes he will drink 3-4 bottles of wine. Pt reports he has been drinking this amount for the past month, last drink was this morning. Reports he has been drinking for the past 25 years. Longest perios of sobriety 4 years. Denies any other substance use. States stressors are trying to find housing.  Pt denies SI/HI/AVH. Good appetite and poor sleep reported. Pt plans on returning to [facility] on DC.

## 2023-06-12 NOTE — PLAN OF CARE
Problem: Adult Behavioral Health Plan of Care  Goal: Plan of Care Review  Outcome: Ongoing, Progressing  Flowsheets (Taken 6/12/2023 7767)  Progress: no change  Plan of Care Reviewed With: patient  Patient Agreement with Plan of Care: agrees  Outcome Evaluation: Pt calm and cooperative.Staying in bed a lot today.   Goal Outcome Evaluation:  Plan of Care Reviewed With: patient  Patient Agreement with Plan of Care: agrees     Progress: no change  Outcome Evaluation: Pt calm and cooperative.Staying in bed a lot today.

## 2023-06-12 NOTE — H&P
INITIAL PSYCHIATRIC HISTORY & PHYSICAL    Patient Identification:  Name:   Geovani Sarabia  Age:   57 y.o.  Sex:   male  :   1965  MRN:   [id number]  Visit Number:   [id number]  Primary Care Physician:   Provider, No Known    SUBJECTIVE    CC/Focus of Exam: detox    HPI: Geovani Sarabia is a 57 y.o. male who was admitted on 2023 with complaints of alcohol use and withdrawals. The patient reports a long history of substance use. First use was 16 years old. Over time the use increased and the patient  continued to use despite negative consequences. The patient endorses symptoms of tolerance and withdrawals. Has tried to cut down and stop but has not been successful. Spends too much time and resources in pursuit of substance use. Longest period of sobriety is reported to be 4 years.  Currently using 2 fifths of liquor, beer, wine  Last use 2023  Withdrawal symptoms Patient denies any  Patient denies any substance abuse. Patient states that he uses tobacco. Patient states that he has a history of seizures with withdrawal. Patient states that he is unsure why he relapsed. Patient states being homeless as a stressor in his life. Patient denies any history of physical, mental or sexual abuse. Patient rates his appetite as poor. Patient rates his sleep as poor. Patient states that he has nightmares. Patient rates his anxiety on a scale of 1/10 with 10 being the most severe a 9. Patient rates his depression on a scale of 1/10 with 10 being the most severe a 9. Patient denies any cravings. Patient's CIWA was 8. Patient denies any suicidal ideation. Patient denies any homicidal ideation. Patient denies any hallucinations.    Patient was admitted to [facility] psychiatry for further safety and stabilization.    Available medical/psychiatric records reviewed and incorporated into the current document.     PAST PSYCHIATRIC HX: Patient has had no prior admission. Patient denies any outpatient care.      SUBSTANCE USE HX: UDS was negative. See HPI for current use.     SOCIAL HX: Patient states that he was born and raised in [place]. Patient states that he is currently homeless. Patient states that he is single and has no children. Patient states that he is currently unemployed. Patient states that he has a highschool diploma. Patient denies any legal issues.     Past Medical History:   Diagnosis Date    Alcohol abuse     Anxiety     Seizures     Withdrawal symptoms, alcohol        History reviewed. No pertinent surgical history.    Family History   Family history unknown: Yes         No medications prior to admission.           ALLERGIES:  Patient has no known allergies.    Temp:  [97 °F (36.1 °C)-98.6 °F (37 °C)] 97.6 °F (36.4 °C)  Heart Rate:  [] 100  Resp:  [16-18] 16  BP: (111-157)/(68-95) 129/88    REVIEW OF SYSTEMS:  Review of Systems   Constitutional:  Positive for chills, diaphoresis and fatigue.   Eyes: Negative.    Respiratory: Negative.     Cardiovascular: Negative.    Gastrointestinal:  Positive for nausea.   Endocrine: Negative.    Genitourinary: Negative.    Musculoskeletal: Negative.    Skin: Negative.    Allergic/Immunologic: Negative.    Neurological:  Positive for tremors and weakness.   Psychiatric/Behavioral:  Positive for dysphoric mood. The patient is nervous/anxious.     See HPI for psychiatric ROS  OBJECTIVE    PHYSICAL EXAM:  Physical Exam  Constitutional:  Appears well-developed and well-nourished.   HENT:   Head: Normocephalic and atraumatic.   Right Ear: External ear normal.   Left Ear: External ear normal.   Mouth/Throat: Oropharynx is clear and moist.   Eyes: Pupils are equal, round, and reactive to light. Conjunctivae and EOM are normal.   Neck: Normal range of motion. Neck supple.   Cardiovascular: Normal rate, regular rhythm and normal heart sounds.    Respiratory: Effort normal and breath sounds normal. No respiratory distress. No wheezes.   GI: Soft. Bowel sounds are  normal.No distension. There is no tenderness.   Musculoskeletal: Normal range of motion. No edema or deformity.   Neurological:No cranial nerve deficit. Coordination normal.   Skin: Skin is warm and dry. No rash noted. No erythema.     MENTAL STATUS EXAM:   Hygiene:   fair  Cooperation:  Cooperative  Eye Contact:  Closed  Psychomotor Behavior:  Appropriate  Affect:  Appropriate  Hopelessness: 6  Speech:  Minimal  Linear  Thought Content:  Normal  Suicidal:  None  Homicidal:  None  Hallucinations:  None  Delusion:  None  Memory:  Intact  Orientation:  Person, Place, Time, and Situation  Reliability:  fair  Insight:  Fair  Judgement:  Poor  Impulse Control:  Poor      Imaging Results (Last 24 Hours)       ** No results found for the last 24 hours. **             ECG/EMG Results (most recent)       Procedure Component Value Units Date/Time    ECG 12 Lead Other [[id number]] Collected: 06/11/23 1955     Updated: 06/12/23 0943     QT Interval 350 ms      QTC Interval 453 ms     Narrative:      Test Reason : Other~  Blood Pressure :   */*   mmHG  Vent. Rate : 101 BPM     Atrial Rate : 101 BPM     P-R Int : 134 ms          QRS Dur :  96 ms      QT Int : 350 ms       P-R-T Axes :  65  66  68 degrees     QTc Int : 453 ms    Sinus tachycardia with occasional premature ventricular complexes  Otherwise normal ECG  When compared with ECG of 20-AUG-2022 12:31,  premature ventricular complexes are now present  aberrant conduction is no longer present  Confirmed by Sonido Samuel ([id number]) on 6/12/2023 9:42:26 AM    Referred By:            Confirmed By: Sonido Samuel             Lab Results   Component Value Date    GLUCOSE 110 (H) 06/11/2023    BUN 2 (L) 06/11/2023    CREATININE 0.70 (L) 06/11/2023    BCR 2.9 (L) 06/11/2023    CO2 21.5 (L) 06/11/2023    CALCIUM 9.0 06/11/2023    ALBUMIN 4.2 06/11/2023    AST 67 (H) 06/11/2023    ALT 64 (H) 06/11/2023       Lab Results   Component Value Date    WBC 7.27 06/11/2023    HGB 16.3 06/11/2023     HCT 46.3 06/11/2023    MCV 87.2 06/11/2023     06/11/2023       Pain Management Panel          Latest Ref Rng & Units 6/11/2023 8/20/2022   Pain Management Panel   Amphetamine, Urine Qual Negative Negative  Negative    Barbiturates Screen, Urine Negative Negative  Negative    Benzodiazepine Screen, Urine Negative Negative  Positive    Buprenorphine, Screen, Urine Negative Negative  Negative    Cocaine Screen, Urine Negative Negative  Negative    Methadone Screen , Urine Negative Negative  Negative    Methamphetamine, Ur Negative Negative  Negative        Brief Urine Lab Results  (Last result in the past 365 days)        Color   Clarity   Blood   Leuk Est   Nitrite   Protein   CREAT   Urine HCG        06/11/23 1609 Yellow   Cloudy   Trace   Negative   Negative   Negative                   DATA  Labs reviewed.  Sodium 135, chloride 94, glucose 110, AST 67, ALT 64.  Urinalysis shows cloudy appearance, trace blood, 13-20 RBC.  Blood alcohol level was 69 mg/dL.  Urine drug screen is negative.  EKG reviewed.  QTc interval 453 ms.  RADHA reviewed.  Active controlled prescriptions noted  Record reviewed.  No previous treatment noted in this hospital for mental health or substance use problems.  .       ASSESSMENT & PLAN:        Alcohol use disorder, severe, dependence    Alcohol withdrawal  -Ativan detox  -Thiamine and folate      The patient has been admitted for safety and stabilization.  Patient will be monitored for suicidality daily and maintained on Special Precautions Level 4 (q30 min checks).  The patient will have individual and group therapy with a master's level therapist. A master treatment plan will be developed and agreed upon by the patient and his/her treatment team.  The patient's estimated length of stay in the hospital is 5-7 days.       Written by Madie Del Valle acting as scribe for Dr.Mazhar Bello signature on this note affirms that the note adequately documents the care provided.   This  note was generated using a scribe,   Madie Del Valle MA  06/12/23  1:36 PM EDT    I, Stefani Bello MD, personally performed the services described in this documentation as scribed by the above named individual in my presence, and it is both accurate and complete.

## 2023-06-12 NOTE — PLAN OF CARE
Problem: Adult Behavioral Health Plan of Care  Goal: Plan of Care Review  Outcome: Ongoing, Progressing  Flowsheets (Taken 6/11/2023 2025)  Progress: no change  Plan of Care Reviewed With: patient  Patient Agreement with Plan of Care: agrees  Outcome Evaluation: new admit   Goal Outcome Evaluation:  Plan of Care Reviewed With: patient  Patient Agreement with Plan of Care: agrees     Progress: no change  Outcome Evaluation: new admit

## 2023-06-13 PROCEDURE — 99232 SBSQ HOSP IP/OBS MODERATE 35: CPT | Performed by: PSYCHIATRY & NEUROLOGY

## 2023-06-13 RX ADMIN — LORAZEPAM 1.5 MG: 0.5 TABLET ORAL at 08:10

## 2023-06-13 RX ADMIN — LORAZEPAM 1.5 MG: 0.5 TABLET ORAL at 21:28

## 2023-06-13 RX ADMIN — LORAZEPAM 1.5 MG: 0.5 TABLET ORAL at 14:06

## 2023-06-13 RX ADMIN — Medication 100 MG: at 08:10

## 2023-06-13 RX ADMIN — IBUPROFEN 400 MG: 400 TABLET, FILM COATED ORAL at 08:10

## 2023-06-13 RX ADMIN — Medication 1 TABLET: at 08:08

## 2023-06-13 RX ADMIN — HYDROXYZINE HYDROCHLORIDE 50 MG: 50 TABLET ORAL at 08:10

## 2023-06-13 RX ADMIN — Medication 2 TABLET: at 08:10

## 2023-06-13 RX ADMIN — HYDROXYZINE HYDROCHLORIDE 50 MG: 50 TABLET ORAL at 14:06

## 2023-06-13 NOTE — PROGRESS NOTES
"INPATIENT PSYCHIATRIC PROGRESS NOTE    Name:  Geovani Sarabia  :  1965  MRN:  [id number]  Visit Number:  [id number]  Length of stay:  2    SUBJECTIVE    CC/Focus of Exam: alcohol use    INTERVAL HISTORY:  The patient reports he is feeling a bit better though having ongoing alcohol withdrawals.   Depression rating 7/10  Anxiety rating 10/10  Sleep:fair  Withdrawal sx: weakness, tremors.  Cravin/10    Review of Systems   Respiratory: Negative.     Cardiovascular: Negative.    Gastrointestinal:  Positive for diarrhea.   Neurological:  Positive for tremors and weakness.   Psychiatric/Behavioral:  Positive for dysphoric mood. The patient is nervous/anxious.      OBJECTIVE    Temp:  [97.6 °F (36.4 °C)-98.6 °F (37 °C)] 97.8 °F (36.6 °C)  Heart Rate:  [] 109  Resp:  [16-18] 18  BP: (124-134)/(74-90) 131/90    MENTAL STATUS EXAM:  Appearance:Casually dressed, good hygeine.   Cooperation:Cooperative  Psychomotor: No psychomotor agitation/retardation, No EPS, No motor tics  Speech-normal rate, amount.  Mood \"anxious\"   Affect-congruent, appropriate, stable  Thought Content-goal directed, no delusional material present  Thought process-linear, organized.  Suicidality: No SI  Homicidality: No HI  Perception: No AH/VH  Insight-fair   Judgement-fair    Lab Results (last 24 hours)       ** No results found for the last 24 hours. **               Imaging Results (Last 24 Hours)       ** No results found for the last 24 hours. **               ECG/EMG Results (most recent)       Procedure Component Value Units Date/Time    ECG 12 Lead Other [[id number]] Collected: 23     Updated: 23 0943     QT Interval 350 ms      QTC Interval 453 ms     Narrative:      Test Reason : Other~  Blood Pressure :   */*   mmHG  Vent. Rate : 101 BPM     Atrial Rate : 101 BPM     P-R Int : 134 ms          QRS Dur :  96 ms      QT Int : 350 ms       P-R-T Axes :  65  66  68 degrees     QTc Int : 453 ms    Sinus tachycardia " with occasional premature ventricular complexes  Otherwise normal ECG  When compared with ECG of 20-AUG-2022 12:31,  premature ventricular complexes are now present  aberrant conduction is no longer present  Confirmed by Sonido Samuel () on 2023 9:42:26 AM    Referred By:            Confirmed By: Sonido Samuel             ALLERGIES: Patient has no known allergies.    Medication Review:   Scheduled Medications:  B-complex with vitamin C, 2 tablet, Oral, Daily  LORazepam, 1.5 mg, Oral, 3 times per day   Followed by  [START ON 2023] LORazepam, 1 mg, Oral, 3 times per day   Followed by  [START ON 6/15/2023] LORazepam, 0.5 mg, Oral, 3 times per day  multivitamin with minerals, 1 tablet, Oral, Daily  nicotine, 1 patch, Transdermal, Q24H  thiamine, 100 mg, Oral, Daily         PRN Medications:    aluminum-magnesium hydroxide-simethicone    benzonatate    benztropine **OR** benztropine    famotidine    hydrOXYzine    ibuprofen    loperamide    LORazepam    LORazepam    [] LORazepam **FOLLOWED BY** LORazepam **FOLLOWED BY** [START ON 2023] LORazepam **FOLLOWED BY** [START ON 6/15/2023] LORazepam    LORazepam    magnesium hydroxide    ondansetron    sodium chloride    traZODone   All medications reviewed.    ASSESSMENT & PLAN:      Alcohol use disorder, severe, dependence    Alcohol withdrawal  -Continue Ativan detox  -Thiamine and folate    Special precautions: Special Precautions Level 4 (q30 min checks).    Behavioral Health Treatment Plan and Problem List: I have reviewed and approved the Behavioral Health Treatment Plan and Problem list.  The patient has had a chance to review and agrees with the treatment plan.    Copied text in portions of this note has been reviewed and is accurate as of 23         Clinician:  Stefani Bello MD  23  12:37 EDT   DVT ppx- SCDs   GI ppx- Pantoprazole 40mg injectable BID

## 2023-06-13 NOTE — PLAN OF CARE
Problem: Adult Behavioral Health Plan of Care  Goal: Plan of Care Review  Outcome: Ongoing, Progressing  Flowsheets (Taken 6/13/2023 1325)  Progress: improving  Plan of Care Reviewed With: patient  Patient Agreement with Plan of Care: agrees  Outcome Evaluation: pt staying in bed some today.pleasant and cooperative.   Goal Outcome Evaluation:  Plan of Care Reviewed With: patient  Patient Agreement with Plan of Care: agrees     Progress: improving  Outcome Evaluation: pt staying in bed some today.pleasant and cooperative.

## 2023-06-13 NOTE — PLAN OF CARE
Goal Outcome Evaluation:        Problem: Adult Behavioral Health Plan of Care  Goal: Patient-Specific Goal (Individualization)  Outcome: Ongoing, Progressing  Flowsheets  Taken 6/12/2023 1354  Patient-Specific Goals (Include Timeframe): Identify 2-3 coping skills, address relapse prevention methods, complete aftercare plans, and deny SI/HI prior to discharge.  Individualized Care Needs: Therapist to offer 1-4 therapy sessions, aftercare planning, safety planning, family education, group therapy, and brief CBT/MI interventions.  Anxieties, Fears or Concerns: none verbalized  Taken 6/12/2023 0971  Patient Personal Strengths:   motivated for recovery   motivated for treatment   independent living skills   resilient   resourceful   intellectual cognitive skills   positive educational history   spiritual/Restorationism support   self-awareness   expressive of needs   expressive of emotions  Patient Vulnerabilities:   occupational insecurity   history of unsuccessful treatment   substance abuse/addiction   poor impulse control   adverse childhood experience(s)   family/relationship conflict   limited support system  Goal: Optimized Coping Skills in Response to Life Stressors  Outcome: Ongoing, Progressing  Flowsheets (Taken 6/12/2023 1354)  Optimized Coping Skills in Response to Life Stressors: making progress toward outcome  Intervention: Promote Effective Coping Strategies  Flowsheets (Taken 6/12/2023 1354)  Supportive Measures:   active listening utilized   counseling provided   decision-making supported   goal-setting facilitated   self-reflection promoted   verbalization of feelings encouraged   self-responsibility promoted   positive reinforcement provided   self-care encouraged         DATA:  Therapist met with Patient individually this date. Patient agreeable to discuss current treatment progress and discharge concerns.     CLINICAL MANUVERING/INTERVENTIONS:  Assisted Patient in processing session content; acknowledged  and normalized Patient’s thoughts, feelings, and concerns by utilizing a person-centered approach in efforts to build appropriate rapport and a positive therapeutic relationship with open and honest communication. Allowed Patient to ventilate regarding current stressors and triggers for negative emotions and thoughts in a safe nonjudgmental environment with unconditional positive regard, active listening skills, and empathy. Therapist implemented motivational interviewing techniques to assist Patient with exploring personal growth and change and discussed distress tolerance skills, self soothing techniques, and applied cognitive behavioral strategies to facilitate identification of maladaptive patterns of thinking and behavior.Therapist utilized dialectical behavior techniques to teach and model emotional regulation and relaxation methods. Therapist assisted Patient with identifying and implementing healthier coping strategies. Therapist assisted Patient with safety planning; Patient agreed to continue honest communication with Treatment Team while inpatient and identify any SI/HI.  Patient encouraged to seek nearest ER or contact 911 if danger to self or others post discharge.     ASSESSMENT:    Patient continues to receive treatment for alcohol detox. Patient reports high anxiety and moderate depression, denies current SI/HI/AVH. Patient reports experiencing weakness and tremors. Patient plans to return to [facility] at discharge, agency to provide transportation. Patient has not been agreeable to family involvement in treatment.     PLAN:   Patient will continue stabilization. Patient will continue to receive services offered by Treatment Team.     Patient to return to [facility] when stable.

## 2023-06-13 NOTE — PLAN OF CARE
Goal Outcome Evaluation:           Progress: improving  Outcome Evaluation: pt calm and cooperative. pt reports he is feeling better. pt took a shower this.

## 2023-06-14 RX ORDER — VENLAFAXINE HYDROCHLORIDE 37.5 MG/1
37.5 CAPSULE, EXTENDED RELEASE ORAL
Status: DISCONTINUED | OUTPATIENT
Start: 2023-06-14 | End: 2023-06-16

## 2023-06-14 RX ADMIN — VENLAFAXINE HYDROCHLORIDE 37.5 MG: 37.5 CAPSULE, EXTENDED RELEASE ORAL at 12:16

## 2023-06-14 RX ADMIN — LORAZEPAM 1 MG: 1 TABLET ORAL at 21:22

## 2023-06-14 RX ADMIN — Medication 100 MG: at 08:36

## 2023-06-14 RX ADMIN — Medication 2 TABLET: at 08:36

## 2023-06-14 RX ADMIN — Medication 1 TABLET: at 08:36

## 2023-06-14 RX ADMIN — LORAZEPAM 1 MG: 1 TABLET ORAL at 14:39

## 2023-06-14 RX ADMIN — LORAZEPAM 1 MG: 1 TABLET ORAL at 08:36

## 2023-06-14 NOTE — PLAN OF CARE
Goal Outcome Evaluation:        Problem: Adult Behavioral Health Plan of Care  Goal: Patient-Specific Goal (Individualization)  Outcome: Ongoing, Progressing  Flowsheets  Taken 6/12/2023 1354  Patient-Specific Goals (Include Timeframe): Identify 2-3 coping skills, address relapse prevention methods, complete aftercare plans, and deny SI/HI prior to discharge.  Individualized Care Needs: Therapist to offer 1-4 therapy sessions, aftercare planning, safety planning, family education, group therapy, and brief CBT/MI interventions.  Anxieties, Fears or Concerns: none verbalized  Taken 6/12/2023 1970  Patient Personal Strengths:  • motivated for recovery  • motivated for treatment  • independent living skills  • resilient  • resourceful  • intellectual cognitive skills  • positive educational history  • spiritual/Christian support  • self-awareness  • expressive of needs  • expressive of emotions  Patient Vulnerabilities:  • occupational insecurity  • history of unsuccessful treatment  • substance abuse/addiction  • poor impulse control  • adverse childhood experience(s)  • family/relationship conflict  • limited support system  Goal: Optimized Coping Skills in Response to Life Stressors  Outcome: Ongoing, Progressing  Flowsheets (Taken 6/12/2023 1354)  Optimized Coping Skills in Response to Life Stressors: making progress toward outcome  Intervention: Promote Effective Coping Strategies  Flowsheets (Taken 6/14/2023 1108)  Supportive Measures:  • active listening utilized  • counseling provided  • decision-making supported  • goal-setting facilitated  • verbalization of feelings encouraged  • self-responsibility promoted  • positive reinforcement provided  • self-reflection promoted  • self-care encouraged  Goal: Develops/Participates in Therapeutic [place] to Support Successful Transition  Outcome: Ongoing, Progressing  Flowsheets (Taken 6/12/2023 1354)  Develops/Participates in Therapeutic [place] to Support  Successful Transition: making progress toward outcome  Intervention: Foster Therapeutic [place]  Flowsheets (Taken 6/14/2023 1108)  Trust Relationship/Rapport:  • care explained  • reassurance provided  • choices provided  • thoughts/feelings acknowledged  • emotional support provided  • empathic listening provided  • questions answered  • questions encouraged  Intervention: Mutually Develop Transition Plan  Flowsheets  Taken 6/14/2023 1107  Discharge Coordination/Progress: Patient has [place] Medicaid. Patient to return to [facility] at discharge, agency to transport.  Transportation Anticipated: agency  Transportation Concerns: none  Current Discharge Risk:  • substance use/abuse  • lack of support system/caregiver  Concerns to be Addressed:  • substance/tobacco abuse/use  • coping/stress  • mental health  • cognitive/perceptual  • discharge planning  Readmission Within the Last 30 Days: no previous admission in last 30 days  Patient/Family Anticipated Services at Transition: rehabilitation services  Patient's Choice of Community Agency(s): [facility]  Patient/Family Anticipates Transition to: inpatient rehabilitation facility  Offered/Gave Vendor List: no  Taken 6/12/2023 1354  Outpatient/Agency/Support Group Needs: residential services  Transition Support:  • follow-up care discussed  • community resources reviewed  • crisis management plan verbalized  • crisis management plan promoted  • follow-up care coordinated  Anticipated Discharge Disposition: residential substance use unit         DATA:  Therapist met with Patient individually this date. Patient agreeable to discuss current treatment progress and discharge concerns.     CLINICAL MANUVERING/INTERVENTIONS:  Assisted Patient in processing session content; acknowledged and normalized Patient’s thoughts, feelings, and concerns by utilizing a person-centered approach in efforts to build appropriate rapport and a positive therapeutic  relationship with open and honest communication. Allowed Patient to ventilate regarding current stressors and triggers for negative emotions and thoughts in a safe nonjudgmental environment with unconditional positive regard, active listening skills, and empathy. Therapist implemented motivational interviewing techniques to assist Patient with exploring personal growth and change and discussed distress tolerance skills, self soothing techniques, and applied cognitive behavioral strategies to facilitate identification of maladaptive patterns of thinking and behavior.Therapist utilized dialectical behavior techniques to teach and model emotional regulation and relaxation methods. Therapist assisted Patient with identifying and implementing healthier coping strategies. Therapist assisted Patient with safety planning; Patient agreed to continue honest communication with Treatment Team while inpatient and identify any SI/HI.  Patient encouraged to seek nearest ER or contact 911 if danger to self or others post discharge.     ASSESSMENT:    Therapist met with patient on this date, patient continues to receive treatment for alcohol detox. Patient reports high anxiety and depression, denies current SI/HI/AVH. Patient reports experiencing weakness and tremors. Patient to return to [facility] at discharge, agency will provide transportation.     PLAN:   Patient will continue stabilization. Patient will continue to receive services offered by Treatment Team.     Patient to return to [facility] when stable.

## 2023-06-14 NOTE — PLAN OF CARE
Goal Outcome Evaluation:  Plan of Care Reviewed With: patient  Patient Agreement with Plan of Care: agrees     Progress: improving  Outcome Evaluation: Patient rates anxiety, depression, and cravings all as 8/10. Pt denies SI/HI/AVH. Pt reports good appetite and sleep.

## 2023-06-14 NOTE — PLAN OF CARE
Goal Outcome Evaluation:  Plan of Care Reviewed With: patient  Patient Agreement with Plan of Care: agrees     Progress: improving  Outcome Evaluation: Calm and cooperative. Reports lessened appetite. Rates anxiety 9, depression 8, cravings 7.

## 2023-06-14 NOTE — PROGRESS NOTES
"INPATIENT PSYCHIATRIC PROGRESS NOTE    Name:  Geovani Sarabia  :  1965  MRN:  [id number]  Visit Number:  [id number]  Length of stay:  3    SUBJECTIVE    CC/Focus of Exam: alcohol use    INTERVAL HISTORY:  First time seeing patient.  Chart, notes, vitals, labs and EKG personally reviewed.  ALT/AST 64/67, glucose 110, ethanol 113 on arrival to the ED.    The patient reports mild improvement, but still experiencing headache, tremor, weakness, chills, fatigue.  GI symptoms improving.  Patient reports a long history of generalized anxiety, which often contributes to alcohol use.  He struggles to drive, preferring a bicycle, due to anxiety.    Depression rating 10  Anxiety rating 10/10  Sleep:fair  Withdrawal sx: Per HPI  Cravin/10    Review of Systems   Constitutional:  Positive for chills.   Respiratory: Negative.     Cardiovascular: Negative.    Gastrointestinal: Negative.    Musculoskeletal:  Positive for myalgias.   Neurological:  Positive for tremors and weakness.   Psychiatric/Behavioral:  Positive for dysphoric mood. The patient is nervous/anxious.      OBJECTIVE    Temp:  [97 °F (36.1 °C)-98.5 °F (36.9 °C)] 98 °F (36.7 °C)  Heart Rate:  [89-98] 98  Resp:  [16-18] 18  BP: (134-151)/() 137/100    MENTAL STATUS EXAM:  Appearance:Casually dressed, good hygeine.   Cooperation:Cooperative  Psychomotor: No psychomotor agitation/retardation, No EPS, No motor tics  Speech-normal rate, amount.  Mood \"anxious\"   Affect-congruent, appropriate, stable  Thought Content-goal directed, no delusional material present  Thought process-linear, organized.  Suicidality: No SI  Homicidality: No HI  Perception: No AH/VH  Insight-fair   Judgement-fair    Lab Results (last 24 hours)       ** No results found for the last 24 hours. **               Imaging Results (Last 24 Hours)       ** No results found for the last 24 hours. **               ECG/EMG Results (most recent)       Procedure Component Value Units " Date/Time    ECG 12 Lead Other [[id number]] Collected: 23     Updated: 23 0943     QT Interval 350 ms      QTC Interval 453 ms     Narrative:      Test Reason : Other~  Blood Pressure :   */*   mmHG  Vent. Rate : 101 BPM     Atrial Rate : 101 BPM     P-R Int : 134 ms          QRS Dur :  96 ms      QT Int : 350 ms       P-R-T Axes :  65  66  68 degrees     QTc Int : 453 ms    Sinus tachycardia with occasional premature ventricular complexes  Otherwise normal ECG  When compared with ECG of 20-AUG-2022 12:31,  premature ventricular complexes are now present  aberrant conduction is no longer present  Confirmed by Sonido Samuel () on 2023 9:42:26 AM    Referred By:            Confirmed By: Sonido Samuel             ALLERGIES: Patient has no known allergies.    Medication Review:   Scheduled Medications:  B-complex with vitamin C, 2 tablet, Oral, Daily  LORazepam, 1 mg, Oral, 3 times per day   Followed by  [START ON 6/15/2023] LORazepam, 0.5 mg, Oral, 3 times per day  multivitamin with minerals, 1 tablet, Oral, Daily  nicotine, 1 patch, Transdermal, Q24H  thiamine, 100 mg, Oral, Daily         PRN Medications:    aluminum-magnesium hydroxide-simethicone    benzonatate    benztropine **OR** benztropine    famotidine    hydrOXYzine    ibuprofen    loperamide    LORazepam    LORazepam    [] LORazepam **FOLLOWED BY** [] LORazepam **FOLLOWED BY** LORazepam **FOLLOWED BY** [START ON 6/15/2023] LORazepam    LORazepam    magnesium hydroxide    ondansetron    sodium chloride    traZODone   All medications reviewed.    ASSESSMENT & PLAN:      Alcohol use disorder, severe, dependence    Alcohol withdrawal  -Continue Ativan detox  -Thiamine and folate    Generalized anxiety disorder  -Begin Effexor XR 37.5 mg daily    Special precautions: Special Precautions Level 4 (q30 min checks).    Behavioral Health Treatment Plan and Problem List: I have reviewed and approved the Behavioral Health Treatment  Plan and Problem list.  The patient has had a chance to review and agrees with the treatment plan.    Copied text in portions of this note has been reviewed and is accurate as of 06/14/23         Clinician:  Deng Samuels MD  06/14/23  11:20 EDT

## 2023-06-15 RX ADMIN — LORAZEPAM 0.5 MG: 0.5 TABLET ORAL at 08:25

## 2023-06-15 RX ADMIN — LORAZEPAM 0.5 MG: 0.5 TABLET ORAL at 21:21

## 2023-06-15 RX ADMIN — Medication 100 MG: at 08:25

## 2023-06-15 RX ADMIN — LORAZEPAM 0.5 MG: 0.5 TABLET ORAL at 14:14

## 2023-06-15 RX ADMIN — Medication 1 TABLET: at 08:25

## 2023-06-15 RX ADMIN — Medication 2 TABLET: at 08:25

## 2023-06-15 RX ADMIN — VENLAFAXINE HYDROCHLORIDE 37.5 MG: 37.5 CAPSULE, EXTENDED RELEASE ORAL at 08:25

## 2023-06-15 NOTE — PLAN OF CARE
Goal Outcome Evaluation:  Plan of Care Reviewed With: patient  Patient Agreement with Plan of Care: agrees     Progress: improving  Outcome Evaluation: Pt rates anxiety/depression/cravings 8/10. Pt denies SI/HI/AVH and is pleasant and cooperative with staff and peers.  Pt slept off/on during the night, waking briefly for snacks and then going back to bed.

## 2023-06-15 NOTE — PLAN OF CARE
Goal Outcome Evaluation:        Problem: Adult Behavioral Health Plan of Care  Goal: Patient-Specific Goal (Individualization)  Outcome: Ongoing, Progressing  Flowsheets  Taken 6/12/2023 1354  Patient-Specific Goals (Include Timeframe): Identify 2-3 coping skills, address relapse prevention methods, complete aftercare plans, and deny SI/HI prior to discharge.  Individualized Care Needs: Therapist to offer 1-4 therapy sessions, aftercare planning, safety planning, family education, group therapy, and brief CBT/MI interventions.  Anxieties, Fears or Concerns: none verbalized  Taken 6/12/2023 0943  Patient Personal Strengths:   motivated for recovery   motivated for treatment   independent living skills   resilient   resourceful   intellectual cognitive skills   positive educational history   spiritual/Yazidi support   self-awareness   expressive of needs   expressive of emotions  Patient Vulnerabilities:   occupational insecurity   history of unsuccessful treatment   substance abuse/addiction   poor impulse control   adverse childhood experience(s)   family/relationship conflict   limited support system  Goal: Optimized Coping Skills in Response to Life Stressors  Outcome: Ongoing, Progressing  Flowsheets (Taken 6/12/2023 1354)  Optimized Coping Skills in Response to Life Stressors: making progress toward outcome  Intervention: Promote Effective Coping Strategies  Flowsheets (Taken 6/15/2023 8026)  Supportive Measures:   active listening utilized   counseling provided   decision-making supported   goal-setting facilitated   verbalization of feelings encouraged   self-responsibility promoted   positive reinforcement provided   self-reflection promoted   self-care encouraged  Goal: Develops/Participates in Therapeutic [place] to Support Successful Transition  Outcome: Ongoing, Progressing  Flowsheets (Taken 6/12/2023 1354)  Develops/Participates in Therapeutic [place] to Support Successful Transition: making  progress toward outcome  Intervention: Foster Therapeutic [place]  Flowsheets (Taken 6/15/2023 1436)  Trust Relationship/Rapport:   care explained   reassurance provided   choices provided   thoughts/feelings acknowledged   emotional support provided   empathic listening provided   questions answered   questions encouraged  Intervention: Mutually Develop Transition Plan  Flowsheets  Taken 6/15/2023 1435  Discharge Coordination/Progress: Patient has [place] Medicaid. Patient to return to [facility] when stable, agency to transport.  Transportation Anticipated: agency  Transportation Concerns: none  Current Discharge Risk:   substance use/abuse   lack of support system/caregiver  Concerns to be Addressed:   substance/tobacco abuse/use   adjustment to diagnosis/illness   coping/stress   mental health   discharge planning   cognitive/perceptual  Readmission Within the Last 30 Days: no previous admission in last 30 days  Patient/Family Anticipated Services at Transition: rehabilitation services  Patient's Choice of Community Agency(s): [facility]  Patient/Family Anticipates Transition to: inpatient rehabilitation facility  Offered/Gave Vendor List: no  Taken 6/12/2023 1354  Outpatient/Agency/Support Group Needs: residential services  Transition Support:   follow-up care discussed   community resources reviewed   crisis management plan verbalized   crisis management plan promoted   follow-up care coordinated  Anticipated Discharge Disposition: residential substance use unit      DATA:  Therapist met with Patient individually this date. Patient agreeable to discuss current treatment progress and discharge concerns.     CLINICAL MANUVERING/INTERVENTIONS:  Assisted Patient in processing session content; acknowledged and normalized Patient’s thoughts, feelings, and concerns by utilizing a person-centered approach in efforts to build appropriate rapport and a positive therapeutic relationship with  open and honest communication. Allowed Patient to ventilate regarding current stressors and triggers for negative emotions and thoughts in a safe nonjudgmental environment with unconditional positive regard, active listening skills, and empathy. Therapist implemented motivational interviewing techniques to assist Patient with exploring personal growth and change and discussed distress tolerance skills, self soothing techniques, and applied cognitive behavioral strategies to facilitate identification of maladaptive patterns of thinking and behavior.Therapist utilized dialectical behavior techniques to teach and model emotional regulation and relaxation methods. Therapist assisted Patient with identifying and implementing healthier coping strategies. Therapist assisted Patient with safety planning; Patient agreed to continue honest communication with Treatment Team while inpatient and identify any SI/HI.  Patient encouraged to seek nearest ER or contact 911 if danger to self or others post discharge.     ASSESSMENT:    Therapist met with patient on this date, patient continues to receive treatment for alcohol detox. Patient reports moderate anxiety and mild depression, denies current SI/HI/AVH. Patient reports improvement in withdrawal symptoms and anticipates being ready to discharge tomorrow. Patient is agreeable to return to [facility], agency will provide transportation.     PLAN:   Patient will continue stabilization. Patient will continue to receive services offered by Treatment Team.     Patient to return to [facility] when stable.

## 2023-06-15 NOTE — PLAN OF CARE
Goal Outcome Evaluation:  Plan of Care Reviewed With: patient  Patient Agreement with Plan of Care: agrees     Progress: improving  Outcome Evaluation: Pt has been calm and cooperative, rates anxiety 7, depression 6. Pt states sleep and appetite are good. Pt denies SI/HI/AVH, rates cravings 0 and states his only w/d sx is being agitated easily.

## 2023-06-15 NOTE — PROGRESS NOTES
"INPATIENT PSYCHIATRIC PROGRESS NOTE    Name:  Geovani Sarabia  :  1965  MRN:  [id number]  Visit Number:  [id number]  Length of stay:  4    SUBJECTIVE    CC/Focus of Exam: alcohol use    INTERVAL HISTORY:  The patient reports continued improvement in withdrawal symptoms.  Primary symptom of concern is anxiety, which appears to be a pre-existing issue, with patient reporting a long history of obsessive thinking, phobias nearly to a degree of paranoia.  Pleasant and appropriate today.  Likely ready for discharge to rehab tomorrow.     Depression rating 3/10  Anxiety rating 5/10  Sleep: Did  Withdrawal sx: Per HPI  Cravin/10    Review of Systems   Constitutional:  Negative for chills.   Respiratory: Negative.     Cardiovascular: Negative.    Gastrointestinal: Negative.    Musculoskeletal: Negative.    Neurological: Negative.    Psychiatric/Behavioral:  The patient is nervous/anxious.      OBJECTIVE    Temp:  [97.7 °F (36.5 °C)-98.4 °F (36.9 °C)] 97.7 °F (36.5 °C)  Heart Rate:  [] 87  Resp:  [18] 18  BP: (131-146)/() 135/92    MENTAL STATUS EXAM:  Appearance:Casually dressed, good hygeine.   Cooperation:Cooperative  Psychomotor: No psychomotor agitation/retardation, No EPS, No motor tics  Speech-normal rate, amount.  Mood \"anxious but better overall\"   Affect-congruent, appropriate, stable  Thought Content-goal directed, no delusional material present  Thought process-linear, organized.  Suicidality: No SI  Homicidality: No HI  Perception: No AH/VH  Insight-fair   Judgement-fair    Lab Results (last 24 hours)       ** No results found for the last 24 hours. **               Imaging Results (Last 24 Hours)       ** No results found for the last 24 hours. **               ECG/EMG Results (most recent)       Procedure Component Value Units Date/Time    ECG 12 Lead Other [[id number]] Collected: 23     Updated: 23     QT Interval 350 ms      QTC Interval 453 ms     Narrative:   "    Test Reason : Other~  Blood Pressure :   */*   mmHG  Vent. Rate : 101 BPM     Atrial Rate : 101 BPM     P-R Int : 134 ms          QRS Dur :  96 ms      QT Int : 350 ms       P-R-T Axes :  65  66  68 degrees     QTc Int : 453 ms    Sinus tachycardia with occasional premature ventricular complexes  Otherwise normal ECG  When compared with ECG of 20-AUG-2022 12:31,  premature ventricular complexes are now present  aberrant conduction is no longer present  Confirmed by Sonido Samuel () on 2023 9:42:26 AM    Referred By:            Confirmed By: Sonido Samuel             ALLERGIES: Patient has no known allergies.    Medication Review:   Scheduled Medications:  B-complex with vitamin C, 2 tablet, Oral, Daily  LORazepam, 0.5 mg, Oral, 3 times per day  multivitamin with minerals, 1 tablet, Oral, Daily  nicotine, 1 patch, Transdermal, Q24H  thiamine, 100 mg, Oral, Daily  venlafaxine XR, 37.5 mg, Oral, Daily With Breakfast         PRN Medications:    aluminum-magnesium hydroxide-simethicone    benzonatate    benztropine **OR** benztropine    famotidine    hydrOXYzine    ibuprofen    loperamide    LORazepam    LORazepam    [] LORazepam **FOLLOWED BY** [] LORazepam **FOLLOWED BY** [] LORazepam **FOLLOWED BY** LORazepam    LORazepam    magnesium hydroxide    ondansetron    sodium chloride    traZODone   All medications reviewed.    ASSESSMENT & PLAN:      Alcohol use disorder, severe, dependence    Alcohol withdrawal  -Continue Ativan detox  -Thiamine and folate  -Likely ready for rehab tomorrow    Generalized anxiety disorder  -Begin Effexor XR 37.5 mg daily today    Special precautions: Special Precautions Level 4 (q30 min checks).    Behavioral Health Treatment Plan and Problem List: I have reviewed and approved the Behavioral Health Treatment Plan and Problem list.  The patient has had a chance to review and agrees with the treatment plan.    Copied text in portions of this note has been  reviewed and is accurate as of 06/15/23         Clinician:  Deng Samuels MD  06/15/23  14:17 EDT

## 2023-06-16 VITALS
RESPIRATION RATE: 18 BRPM | TEMPERATURE: 97.3 F | WEIGHT: 188 LBS | BODY MASS INDEX: 28.49 KG/M2 | SYSTOLIC BLOOD PRESSURE: 130 MMHG | OXYGEN SATURATION: 97 % | HEIGHT: 68 IN | DIASTOLIC BLOOD PRESSURE: 88 MMHG | HEART RATE: 87 BPM

## 2023-06-16 RX ORDER — VENLAFAXINE HYDROCHLORIDE 75 MG/1
75 CAPSULE, EXTENDED RELEASE ORAL
Status: DISCONTINUED | OUTPATIENT
Start: 2023-06-16 | End: 2023-06-16 | Stop reason: HOSPADM

## 2023-06-16 RX ORDER — VENLAFAXINE HYDROCHLORIDE 75 MG/1
75 CAPSULE, EXTENDED RELEASE ORAL
Qty: 30 CAPSULE | Refills: 0 | Status: SHIPPED | OUTPATIENT
Start: 2023-06-17

## 2023-06-16 RX ORDER — TRAZODONE HYDROCHLORIDE 50 MG/1
50 TABLET ORAL NIGHTLY PRN
Qty: 30 TABLET | Refills: 0 | Status: SHIPPED | OUTPATIENT
Start: 2023-06-16

## 2023-06-16 RX ADMIN — HYDROXYZINE HYDROCHLORIDE 50 MG: 50 TABLET ORAL at 08:12

## 2023-06-16 RX ADMIN — Medication 100 MG: at 08:12

## 2023-06-16 RX ADMIN — Medication 2 TABLET: at 08:11

## 2023-06-16 RX ADMIN — Medication 1 TABLET: at 08:12

## 2023-06-16 RX ADMIN — HYDROXYZINE HYDROCHLORIDE 50 MG: 50 TABLET ORAL at 15:08

## 2023-06-16 RX ADMIN — VENLAFAXINE HYDROCHLORIDE 75 MG: 37.5 CAPSULE, EXTENDED RELEASE ORAL at 08:12

## 2023-06-16 NOTE — PROGRESS NOTES
Navigator contacted [facility] and spoke with Brandie. They will pick patient up later today. ETA 4-5 hours.     [facility] - [phone]

## 2023-06-16 NOTE — DISCHARGE SUMMARY
Date of Discharge: 6/16/2023    Discharge Diagnosis:Principal Problem:    Alcohol use disorder, severe, dependence  Active Problems:    Alcohol withdrawal      Hospital Course:  Patient was admitted for detox and stabilization.   Routine labs were checked.  Patient was assigned a master's level therapist and provided with an opportunity to participate in group and individual chemical dependency counseling on the unit.  Patient was started on an Ativan detox protocol and 100mg of thiamine daily.  Patient progressed through detox well without any major complications.  We did note that he demonstrated patterns of anxious worry and ruminations.  It was thought that this was a longstanding anxiety disorder, specifically generalized anxiety.  He was started on a trial of Effexor and increased to 75 mg daily.  Patient tolerated the Effexor well without side effects.  He was discharged in stable condition.    Consults:   Consults       No orders found from 5/13/2023 to 6/12/2023.            Labs:  Lab Results (all)       Procedure Component Value Units Date/Time    Hepatitis Panel, Acute [[id number]]  (Normal) Collected: 06/12/23 1000    Specimen: Blood Updated: 06/12/23 1153     Hepatitis B Surface Ag Non-Reactive     Hep A IgM Non-Reactive     Hep B C IgM Non-Reactive     Hepatitis C Ab Non-Reactive    Narrative:      Results may be falsely decreased if patient taking Biotin.             Imaging:  Imaging Results (All)       None                    Condition on Discharge:  stable        Mental Status Exam:  Mood: anxious  Affect: mood congruent  Thought Processes: linear  Thought Content: No Delusions voiced  Hallucinations: Denies  Suicidal Thoughts: Denies  Suicidal Plan/Intent: Denies        Vital Signs  Temp:  [97.2 °F (36.2 °C)-97.9 °F (36.6 °C)] 97.3 °F (36.3 °C)  Heart Rate:  [] 87  Resp:  [18] 18  BP: (129-137)/(87-97) 130/88    Discharge Disposition  Home or Self Care    Discharge Medications     Discharge  Medications        New Medications        Instructions Start Date   traZODone 50 MG tablet  Commonly known as: DESYREL   50 mg, Oral, Nightly PRN      venlafaxine XR 75 MG 24 hr capsule  Commonly known as: EFFEXOR-XR   75 mg, Oral, Daily With Breakfast   Start Date: June 17, 2023              Discharge Diet: Regular    Activity at Discharge: As tolerated    Follow-up Appointments:    No future appointments.        Time: I spent 32 minutes on this discharge activity which included: face-to-face encounter with the patient, reviewing the data in the system, coordination of the care with the nursing staff as well as consultants, documentation, and entering orders.      Keith Bay MD  06/16/23  12:14 EDT

## 2023-06-16 NOTE — PROGRESS NOTES
"    Detox Recovery Unit Progress Note   Clinician: Keith Bay MD  Admission Date: 6/11/2023  07:44 EDT 06/16/23    Behavioral Health Treatment Plan and Problem List: I have reviewed and approved the Behavioral Health Treatment Plan and Problem list.    Allergies  No Known Allergies    Hospital Day: 5 days      Assessment completed within view of staff    History  CC: withdrawal symptoms    Interval HPI: Patient seen and evaluated by me.  Chart reviewed. Patient is withdrawing from alcohol.  He is not able to identify any withdrawal Sx today.  He continues to experience pre-existing symptoms of anxiety.  He is anxious about next steps.   ROS otherwise as below.        Interval Review of Systems:   General ROS: negative for - fever.  Positive for withdrawal symptoms mentioned above.  Endocrine ROS: negative for - palpitations  Respiratory ROS: no cough, shortness of breath, or wheezing  Cardiovascular ROS: no chest pain or dyspnea on exertion  Gastrointestinal ROS: no abdominal pain,no black or bloody stools    /97 (BP Location: Right arm, Patient Position: Sitting)   Pulse 73   Temp 97.9 °F (36.6 °C) (Temporal)   Resp 18   Ht 172.7 cm (68\")   Wt 85.3 kg (188 lb)   SpO2 97%   BMI 28.59 kg/m²     Mental Status Exam  Mood: anxious  Affect: mood congruent  Thought Processes: linear  Oriented X3:  yes  Thought Content: sensorium intact   Suicidal Thoughts: denies  Homicidal Thoughts: denies        Medical Decision Making:   Labs:     Lab Results (last 24 hours)       ** No results found for the last 24 hours. **              Radiology:     Imaging Results (Last 24 Hours)       ** No results found for the last 24 hours. **              EKG:     ECG/EMG Results (most recent)       Procedure Component Value Units Date/Time    ECG 12 Lead Other [[id number]] Collected: 06/11/23 1955     Updated: 06/12/23 0943     QT Interval 350 ms      QTC Interval 453 ms     Narrative:      Test Reason : Other~  Blood " Pressure :   */*   mmHG  Vent. Rate : 101 BPM     Atrial Rate : 101 BPM     P-R Int : 134 ms          QRS Dur :  96 ms      QT Int : 350 ms       P-R-T Axes :  65  66  68 degrees     QTc Int : 453 ms    Sinus tachycardia with occasional premature ventricular complexes  Otherwise normal ECG  When compared with ECG of 20-AUG-2022 12:31,  premature ventricular complexes are now present  aberrant conduction is no longer present  Confirmed by Sonido Samuel ([id number]) on 6/12/2023 9:42:26 AM    Referred By:            Confirmed By: Sonido Samuel             Medications:  B-complex with vitamin C, 2 tablet, Oral, Daily  multivitamin with minerals, 1 tablet, Oral, Daily  nicotine, 1 patch, Transdermal, Q24H  thiamine, 100 mg, Oral, Daily  venlafaxine XR, 37.5 mg, Oral, Daily With Breakfast           All medications reviewed.      Assessment and Plan:    Alcohol use disorder, severe, dependence    Alcohol withdrawal  -Completed Ativan detox.     -Thiamine and folate  -Plan discharge to rehab later today     Generalized anxiety disorder  -Increase Effexor XR to 75mg Daily        Continue hospitalization for medical management of drug withdrawal and patient safety and stabilization.  Continue individual and group chemical dependency counseling.   Therapist and treatment team will continue to assist patient in establishing plans for follow-up and rehabilitation that will serve as the next step in care once patient has completed the medical detox.

## 2023-06-16 NOTE — PLAN OF CARE
"Goal Outcome Evaluation:  Plan of Care Reviewed With: patient  Patient Agreement with Plan of Care: agrees     Progress: improving  Outcome Evaluation: Pt calm and cooperative, but states w/d sx are \"still hanging on.\" Pt rates anxiety/depression/craving 7/10 and denies SI/HI/AVH. Pt enjoyed playing the guitar for the group and is pleasant with staff and peers.  Pt appeared to sleep the majority of the night, waking briefly for snacks.       "

## 2023-06-16 NOTE — CASE MANAGEMENT/SOCIAL WORK
Case Management/Social Work    Patient Name:  Geovani Sarabia  YOB: 1965  MRN: [id number]  Admit Date:  6/11/2023    Patient expected to discharge back to [facility] on this date. Patient reports improvement in mood and withdrawal symptoms, denies current SI/HI/AVH. Agency will provide transportation. Therapist has discussed healthy coping skills and relapse prevention with patient. Therapist has assisted patient in identifying risk factors which would indicate the need for higher level of care including thoughts to harm self or others and/or self-harming behavior. Encouraged patient to notify rehab staff, call [phone] or present to the nearest emergency room should any of these events occur.     Electronically signed by:  OMEGA Novak  06/16/23 09:58 EDT

## 2023-06-17 NOTE — PAYOR COMM NOTE
"Brayan Sarabia (57 y.o. Male)       Date of Birth   1965    Social Security Number       Address   [address] [facility] [place] [address]    Home Phone   [phone]    MRN   [id number]       Synagogue   Unknown    Marital Status   Single                            Admission Date   23    Admission Type   Emergency    Admitting Provider   Telly Ardon MD    Attending Provider       Department, Room/Bed   [facility] ADULT CD, 1046/2S       Discharge Date   2023    Discharge Disposition   Home or Self Care    Discharge Destination                                 Attending Provider: (none)   Allergies: No Known Allergies    Isolation: None   Infection: None   Code Status: Prior    Ht: 172.7 cm (68\")   Wt: 85.3 kg (188 lb)    Admission Cmt: None   Principal Problem: Alcohol use disorder, severe, dependence [F10.20]                   Active Insurance as of 2023       Primary Coverage       Payor Plan Insurance Group Employer/Plan Group    ANTHE[employer] MEDICAID [place] MEDICAID [id number]       Payor Plan Address Payor Plan Phone Number Payor Plan Fax Number Effective Dates    [address] [address] [phone]  2022 - None Entered    [facility] [phone]         Subscriber Name Subscriber Birth Date Member ID       BRAYAN SARABIA 1965 [id number]                     Emergency Contacts            No emergency contacts on file.        PLEASE ATTACH THIS DISCHARGE INFORMATION TO AUTHORIZATION # [id number]    RE: Brayan Sarabia  : 1965  Please see demographics for additional information  Admission date: 23  Discharge date: 23  Discharge diagnosis: Alcohol use disorder, severe, dependence (F10.20)  Active Problems    Alcohol withdrawal (F10.939)    Follow up:  [facility]  [phone]  2023  Discharge Summary:  Keith Bay MD   Physician  Psychiatry     Discharge Summary      Signed     Date of Service: 23 " 1214  Creation Time: 06/16/23 1214     Signed            Date of Discharge: 6/16/2023     Discharge Diagnosis:Principal Problem:    Alcohol use disorder, severe, dependence  Active Problems:    Alcohol withdrawal     Hospital Course:  Patient was admitted for detox and stabilization.   Routine labs were checked.  Patient was assigned a master's level therapist and provided with an opportunity to participate in group and individual chemical dependency counseling on the unit.  Patient was started on an Ativan detox protocol and 100mg of thiamine daily.  Patient progressed through detox well without any major complications.  We did note that he demonstrated patterns of anxious worry and ruminations.  It was thought that this was a longstanding anxiety disorder, specifically generalized anxiety.  He was started on a trial of Effexor and increased to 75 mg daily.  Patient tolerated the Effexor well without side effects.  He was discharged in stable condition.     Consults:   Consults         No orders found from 5/13/2023 to 6/12/2023.            Labs:  Lab Results (all)         Procedure Component Value Units Date/Time     Hepatitis Panel, Acute [[id number]]  (Normal) Collected: 06/12/23 1000     Specimen: Blood Updated: 06/12/23 1153       Hepatitis B Surface Ag Non-Reactive       Hep A IgM Non-Reactive       Hep B C IgM Non-Reactive       Hepatitis C Ab Non-Reactive     Narrative:       Results may be falsely decreased if patient taking Biotin.             Imaging:  Imaging Results (All)         None            Condition on Discharge:  stable     Mental Status Exam:  Mood: anxious  Affect: mood congruent  Thought Processes: linear  Thought Content: No Delusions voiced  Hallucinations: Denies  Suicidal Thoughts: Denies  Suicidal Plan/Intent: Denies           Vital Signs  Temp:  [97.2 °F (36.2 °C)-97.9 °F (36.6 °C)] 97.3 °F (36.3 °C)  Heart Rate:  [] 87  Resp:  [18] 18  BP: (129-137)/(87-97) 130/88     Discharge  Disposition  Home or Self Care     Discharge Medications      Discharge Medications          New Medications         Instructions Start Date   traZODone 50 MG tablet  Commonly known as: DESYREL    50 mg, Oral, Nightly PRN        venlafaxine XR 75 MG 24 hr capsule  Commonly known as: EFFEXOR-XR    75 mg, Oral, Daily With Breakfast    Start Date: June 17, 2023                   Discharge Diet: Regular     Activity at Discharge: As tolerated     Follow-up Appointments:     No future appointments.           Time: I spent 32 minutes on this discharge activity which included: face-to-face encounter with the patient, reviewing the data in the system, coordination of the care with the nursing staff as well as consultants, documentation, and entering orders.       Keith Bay MD  06/16/23  12:14 EDT

## 2024-07-01 ENCOUNTER — HOSPITAL ENCOUNTER (OUTPATIENT)
Facility: HOSPITAL | Age: 59
Setting detail: OBSERVATION
LOS: 1 days | Discharge: NURSING, REHAB OR LONG-TERM CARE | End: 2024-07-03
Attending: STUDENT IN AN ORGANIZED HEALTH CARE EDUCATION/TRAINING PROGRAM | Admitting: PSYCHIATRY & NEUROLOGY
Payer: MEDICAID

## 2024-07-01 ENCOUNTER — HOSPITAL ENCOUNTER (EMERGENCY)
Facility: HOSPITAL | Age: 59
Discharge: PSYCHIATRIC OR SUBSTANCE USE FACILITY | End: 2024-07-01
Attending: EMERGENCY MEDICINE | Admitting: EMERGENCY MEDICINE
Payer: MEDICAID

## 2024-07-01 VITALS
OXYGEN SATURATION: 98 % | BODY MASS INDEX: 27.28 KG/M2 | TEMPERATURE: 98.9 F | DIASTOLIC BLOOD PRESSURE: 91 MMHG | SYSTOLIC BLOOD PRESSURE: 137 MMHG | RESPIRATION RATE: 18 BRPM | HEIGHT: 68 IN | HEART RATE: 115 BPM | WEIGHT: 180 LBS

## 2024-07-01 DIAGNOSIS — F10.10 ALCOHOL ABUSE: Primary | ICD-10-CM

## 2024-07-01 DIAGNOSIS — N39.0 ACUTE UTI: ICD-10-CM

## 2024-07-01 LAB
ALBUMIN SERPL-MCNC: 4.2 G/DL (ref 3.5–5.2)
ALBUMIN/GLOB SERPL: 1.4 G/DL
ALP SERPL-CCNC: 161 U/L (ref 39–117)
ALT SERPL W P-5'-P-CCNC: 28 U/L (ref 1–41)
AMPHET+METHAMPHET UR QL: NEGATIVE
AMPHETAMINES UR QL: NEGATIVE
ANION GAP SERPL CALCULATED.3IONS-SCNC: 16 MMOL/L (ref 5–15)
AST SERPL-CCNC: 41 U/L (ref 1–40)
BACTERIA UR QL AUTO: ABNORMAL /HPF
BARBITURATES UR QL SCN: NEGATIVE
BASOPHILS # BLD AUTO: 0.07 10*3/MM3 (ref 0–0.2)
BASOPHILS NFR BLD AUTO: 0.8 % (ref 0–1.5)
BENZODIAZ UR QL SCN: NEGATIVE
BILIRUB SERPL-MCNC: 0.7 MG/DL (ref 0–1.2)
BILIRUB UR QL STRIP: ABNORMAL
BUN SERPL-MCNC: 9 MG/DL (ref 6–20)
BUN/CREAT SERPL: 10.2 (ref 7–25)
BUPRENORPHINE SERPL-MCNC: NEGATIVE NG/ML
CALCIUM SPEC-SCNC: 9 MG/DL (ref 8.6–10.5)
CANNABINOIDS SERPL QL: NEGATIVE
CHLORIDE SERPL-SCNC: 99 MMOL/L (ref 98–107)
CLARITY UR: ABNORMAL
CO2 SERPL-SCNC: 21 MMOL/L (ref 22–29)
COCAINE UR QL: NEGATIVE
COLOR UR: ABNORMAL
CREAT SERPL-MCNC: 0.88 MG/DL (ref 0.76–1.27)
DEPRECATED RDW RBC AUTO: 56.7 FL (ref 37–54)
EGFRCR SERPLBLD CKD-EPI 2021: 99.7 ML/MIN/1.73
EOSINOPHIL # BLD AUTO: 0.14 10*3/MM3 (ref 0–0.4)
EOSINOPHIL NFR BLD AUTO: 1.5 % (ref 0.3–6.2)
ERYTHROCYTE [DISTWIDTH] IN BLOOD BY AUTOMATED COUNT: 17.2 % (ref 12.3–15.4)
ETHANOL BLD-MCNC: <10 MG/DL (ref 0–10)
ETHANOL UR QL: <0.01 %
FENTANYL UR-MCNC: NEGATIVE NG/ML
GLOBULIN UR ELPH-MCNC: 3 GM/DL
GLUCOSE SERPL-MCNC: 126 MG/DL (ref 65–99)
GLUCOSE UR STRIP-MCNC: NEGATIVE MG/DL
HCT VFR BLD AUTO: 49 % (ref 37.5–51)
HGB BLD-MCNC: 16.5 G/DL (ref 13–17.7)
HGB UR QL STRIP.AUTO: ABNORMAL
HOLD SPECIMEN: NORMAL
HYALINE CASTS UR QL AUTO: ABNORMAL /LPF
IMM GRANULOCYTES # BLD AUTO: 0.04 10*3/MM3 (ref 0–0.05)
IMM GRANULOCYTES NFR BLD AUTO: 0.4 % (ref 0–0.5)
KETONES UR QL STRIP: ABNORMAL
LEUKOCYTE ESTERASE UR QL STRIP.AUTO: NEGATIVE
LYMPHOCYTES # BLD AUTO: 1.31 10*3/MM3 (ref 0.7–3.1)
LYMPHOCYTES NFR BLD AUTO: 14.4 % (ref 19.6–45.3)
MCH RBC QN AUTO: 31.2 PG (ref 26.6–33)
MCHC RBC AUTO-ENTMCNC: 33.7 G/DL (ref 31.5–35.7)
MCV RBC AUTO: 92.6 FL (ref 79–97)
METHADONE UR QL SCN: NEGATIVE
MONOCYTES # BLD AUTO: 0.89 10*3/MM3 (ref 0.1–0.9)
MONOCYTES NFR BLD AUTO: 9.8 % (ref 5–12)
MUCOUS THREADS URNS QL MICRO: ABNORMAL /HPF
NEUTROPHILS NFR BLD AUTO: 6.62 10*3/MM3 (ref 1.7–7)
NEUTROPHILS NFR BLD AUTO: 73.1 % (ref 42.7–76)
NITRITE UR QL STRIP: POSITIVE
NRBC BLD AUTO-RTO: 0 /100 WBC (ref 0–0.2)
OPIATES UR QL: NEGATIVE
OXYCODONE UR QL SCN: NEGATIVE
PCP UR QL SCN: NEGATIVE
PH UR STRIP.AUTO: 6 [PH] (ref 5–8)
PLATELET # BLD AUTO: 232 10*3/MM3 (ref 140–450)
PMV BLD AUTO: 8.5 FL (ref 6–12)
POTASSIUM SERPL-SCNC: 3.8 MMOL/L (ref 3.5–5.2)
PROT SERPL-MCNC: 7.2 G/DL (ref 6–8.5)
PROT UR QL STRIP: ABNORMAL
RBC # BLD AUTO: 5.29 10*6/MM3 (ref 4.14–5.8)
RBC # UR STRIP: ABNORMAL /HPF
REF LAB TEST METHOD: ABNORMAL
SODIUM SERPL-SCNC: 136 MMOL/L (ref 136–145)
SP GR UR STRIP: >1.03 (ref 1–1.03)
SPERM URNS QL MICRO: ABNORMAL /HPF
SQUAMOUS #/AREA URNS HPF: ABNORMAL /HPF
TRICYCLICS UR QL SCN: NEGATIVE
UROBILINOGEN UR QL STRIP: ABNORMAL
WBC # UR STRIP: ABNORMAL /HPF
WBC NRBC COR # BLD AUTO: 9.07 10*3/MM3 (ref 3.4–10.8)
WHOLE BLOOD HOLD COAG: NORMAL
WHOLE BLOOD HOLD SPECIMEN: NORMAL

## 2024-07-01 PROCEDURE — 82077 ASSAY SPEC XCP UR&BREATH IA: CPT | Performed by: PHYSICIAN ASSISTANT

## 2024-07-01 PROCEDURE — 81001 URINALYSIS AUTO W/SCOPE: CPT | Performed by: PHYSICIAN ASSISTANT

## 2024-07-01 PROCEDURE — G0378 HOSPITAL OBSERVATION PER HR: HCPCS

## 2024-07-01 PROCEDURE — 87086 URINE CULTURE/COLONY COUNT: CPT | Performed by: PHYSICIAN ASSISTANT

## 2024-07-01 PROCEDURE — 93005 ELECTROCARDIOGRAM TRACING: CPT | Performed by: STUDENT IN AN ORGANIZED HEALTH CARE EDUCATION/TRAINING PROGRAM

## 2024-07-01 PROCEDURE — 80053 COMPREHEN METABOLIC PANEL: CPT | Performed by: PHYSICIAN ASSISTANT

## 2024-07-01 PROCEDURE — 99285 EMERGENCY DEPT VISIT HI MDM: CPT

## 2024-07-01 PROCEDURE — 80307 DRUG TEST PRSMV CHEM ANLYZR: CPT | Performed by: PHYSICIAN ASSISTANT

## 2024-07-01 PROCEDURE — 85025 COMPLETE CBC W/AUTO DIFF WBC: CPT | Performed by: PHYSICIAN ASSISTANT

## 2024-07-01 PROCEDURE — 36415 COLL VENOUS BLD VENIPUNCTURE: CPT

## 2024-07-01 RX ORDER — ONDANSETRON 4 MG/1
4 TABLET, ORALLY DISINTEGRATING ORAL EVERY 6 HOURS PRN
Status: DISCONTINUED | OUTPATIENT
Start: 2024-07-01 | End: 2024-07-03 | Stop reason: HOSPADM

## 2024-07-01 RX ORDER — ACETAMINOPHEN 325 MG/1
650 TABLET ORAL EVERY 6 HOURS PRN
Status: DISCONTINUED | OUTPATIENT
Start: 2024-07-01 | End: 2024-07-03 | Stop reason: HOSPADM

## 2024-07-01 RX ORDER — LOPERAMIDE HYDROCHLORIDE 2 MG/1
2 CAPSULE ORAL
Status: DISCONTINUED | OUTPATIENT
Start: 2024-07-01 | End: 2024-07-03 | Stop reason: HOSPADM

## 2024-07-01 RX ORDER — MULTIPLE VITAMINS W/ MINERALS TAB 9MG-400MCG
1 TAB ORAL DAILY
Status: DISCONTINUED | OUTPATIENT
Start: 2024-07-02 | End: 2024-07-03 | Stop reason: HOSPADM

## 2024-07-01 RX ORDER — IBUPROFEN 400 MG/1
400 TABLET ORAL EVERY 6 HOURS PRN
Status: DISCONTINUED | OUTPATIENT
Start: 2024-07-01 | End: 2024-07-03 | Stop reason: HOSPADM

## 2024-07-01 RX ORDER — BENZTROPINE MESYLATE 1 MG/1
2 TABLET ORAL ONCE AS NEEDED
Status: DISCONTINUED | OUTPATIENT
Start: 2024-07-01 | End: 2024-07-03 | Stop reason: HOSPADM

## 2024-07-01 RX ORDER — DIPHENOXYLATE HYDROCHLORIDE AND ATROPINE SULFATE 2.5; .025 MG/1; MG/1
TABLET ORAL DAILY
Status: ON HOLD | COMMUNITY
End: 2024-07-01

## 2024-07-01 RX ORDER — FAMOTIDINE 20 MG/1
20 TABLET, FILM COATED ORAL 2 TIMES DAILY PRN
Status: DISCONTINUED | OUTPATIENT
Start: 2024-07-01 | End: 2024-07-03 | Stop reason: HOSPADM

## 2024-07-01 RX ORDER — CEFDINIR 300 MG/1
300 CAPSULE ORAL EVERY 12 HOURS SCHEDULED
Status: DISCONTINUED | OUTPATIENT
Start: 2024-07-01 | End: 2024-07-03 | Stop reason: HOSPADM

## 2024-07-01 RX ORDER — BENZTROPINE MESYLATE 1 MG/ML
1 INJECTION INTRAMUSCULAR; INTRAVENOUS ONCE AS NEEDED
Status: DISCONTINUED | OUTPATIENT
Start: 2024-07-01 | End: 2024-07-03 | Stop reason: HOSPADM

## 2024-07-01 RX ORDER — ECHINACEA PURPUREA EXTRACT 125 MG
2 TABLET ORAL AS NEEDED
Status: DISCONTINUED | OUTPATIENT
Start: 2024-07-01 | End: 2024-07-03 | Stop reason: HOSPADM

## 2024-07-01 RX ORDER — ALUMINA, MAGNESIA, AND SIMETHICONE 2400; 2400; 240 MG/30ML; MG/30ML; MG/30ML
15 SUSPENSION ORAL EVERY 6 HOURS PRN
Status: DISCONTINUED | OUTPATIENT
Start: 2024-07-01 | End: 2024-07-03 | Stop reason: HOSPADM

## 2024-07-01 RX ORDER — TRAZODONE HYDROCHLORIDE 50 MG/1
50 TABLET ORAL NIGHTLY PRN
Status: DISCONTINUED | OUTPATIENT
Start: 2024-07-01 | End: 2024-07-03 | Stop reason: HOSPADM

## 2024-07-01 RX ORDER — TAMSULOSIN HYDROCHLORIDE 0.4 MG/1
1 CAPSULE ORAL DAILY
Status: ON HOLD | COMMUNITY
End: 2024-07-01

## 2024-07-01 RX ORDER — BENZONATATE 100 MG/1
100 CAPSULE ORAL 3 TIMES DAILY PRN
Status: DISCONTINUED | OUTPATIENT
Start: 2024-07-01 | End: 2024-07-03 | Stop reason: HOSPADM

## 2024-07-01 RX ORDER — MULTIVITAMIN WITH IRON
2 TABLET ORAL DAILY
Status: DISCONTINUED | OUTPATIENT
Start: 2024-07-02 | End: 2024-07-03 | Stop reason: HOSPADM

## 2024-07-01 RX ORDER — HYDROXYZINE 50 MG/1
50 TABLET, FILM COATED ORAL EVERY 6 HOURS PRN
Status: DISCONTINUED | OUTPATIENT
Start: 2024-07-01 | End: 2024-07-03 | Stop reason: HOSPADM

## 2024-07-01 RX ADMIN — TRAZODONE HYDROCHLORIDE 50 MG: 50 TABLET ORAL at 21:22

## 2024-07-01 RX ADMIN — CEFDINIR 300 MG: 300 CAPSULE ORAL at 21:22

## 2024-07-01 NOTE — NURSING NOTE
Patient searched per policy by two saff members. Items logged and placed in intake locker. Safety Checks in place.

## 2024-07-01 NOTE — NURSING NOTE
Patient was brought in for detox by [facility]. He reports drinking 1/5 and 6 beers a day for the past month, with his last drink being 2 days ago. He denies any other substance use. He denies si, hi, and avh. He rates anxiety 8/10 and depression 7/10. He denies any cravings at this time. CIWA 5.

## 2024-07-01 NOTE — NURSING NOTE
Attempted to contact Respiratory x4 for EKG, ED busy with high acuity and Code Stroke. Sending patient to floor with order for EKG. Lead RN aware.

## 2024-07-01 NOTE — NURSING NOTE
Spoke with Dr. Pond , discussed assessment and labs, new orders to admit the patient to detox with routine orders, SP4, OBS. [id number]

## 2024-07-01 NOTE — ED PROVIDER NOTES
Subjective   History of Present Illness  58-year-old male patient presents to the emergency room today for detox from alcohol.  Patient states he has been drinking a lot.  He denies SI/HI, AVH.  Patient came from [facility].    History provided by:  Patient   used: No        Review of Systems   Constitutional: Negative.    HENT: Negative.     Eyes: Negative.    Respiratory: Negative.     Cardiovascular: Negative.    Gastrointestinal: Negative.    Endocrine: Negative.    Genitourinary: Negative.    Musculoskeletal: Negative.    Skin: Negative.    Allergic/Immunologic: Negative.    Neurological: Negative.    Hematological: Negative.    Psychiatric/Behavioral: Negative.     All other systems reviewed and are negative.      Past Medical History:   Diagnosis Date    Alcohol abuse     Anxiety     Seizures     Withdrawal symptoms, alcohol        No Known Allergies    History reviewed. No pertinent surgical history.    Family History   Family history unknown: Yes       Social History     Socioeconomic History    Marital status: Single   Tobacco Use    Smoking status: Every Day     Current packs/day: 0.50     Types: Cigarettes    Smokeless tobacco: Never   Vaping Use    Vaping status: Never Used   Substance and Sexual Activity    Alcohol use: Yes     Comment: two fifths of liquor daily along with beer    Drug use: Yes     Types: Other     Comment: Alcohol    Sexual activity: Not Currently           Objective   Physical Exam  Vitals and nursing note reviewed.   Constitutional:       Appearance: Normal appearance. He is normal weight.   HENT:      Head: Normocephalic and atraumatic.      Right Ear: External ear normal.      Left Ear: External ear normal.      Nose: Nose normal.      Mouth/Throat:      Mouth: Mucous membranes are moist.      Pharynx: Oropharynx is clear.   Eyes:      Extraocular Movements: Extraocular movements intact.      Conjunctiva/sclera: Conjunctivae normal.       Pupils: Pupils are equal, round, and reactive to light.   Cardiovascular:      Rate and Rhythm: Normal rate and regular rhythm.      Pulses: Normal pulses.      Heart sounds: Normal heart sounds.   Pulmonary:      Effort: Pulmonary effort is normal.      Breath sounds: Normal breath sounds.   Abdominal:      General: Abdomen is flat. Bowel sounds are normal.      Palpations: Abdomen is soft.   Musculoskeletal:         General: Normal range of motion.      Cervical back: Normal range of motion and neck supple.   Skin:     General: Skin is warm and dry.      Capillary Refill: Capillary refill takes less than 2 seconds.   Neurological:      General: No focal deficit present.      Mental Status: He is alert and oriented to person, place, and time. Mental status is at baseline.   Psychiatric:         Mood and Affect: Mood normal.         Behavior: Behavior normal.         Thought Content: Thought content normal.         Judgment: Judgment normal.         Procedures           ED Course  ED Course as of 07/01/24 1900 Mon Jul 01, 2024 1858 Ethanol: <10 [ML]   1858 WBC: 9.07 [ML]      ED Course User Index  [ML] Ana Serrano PA                                   Results for orders placed or performed during the hospital encounter of 07/01/24   Comprehensive Metabolic Panel    Specimen: Blood   Result Value Ref Range    Glucose 126 (H) 65 - 99 mg/dL    BUN 9 6 - 20 mg/dL    Creatinine 0.88 0.76 - 1.27 mg/dL    Sodium 136 136 - 145 mmol/L    Potassium 3.8 3.5 - 5.2 mmol/L    Chloride 99 98 - 107 mmol/L    CO2 21.0 (L) 22.0 - 29.0 mmol/L    Calcium 9.0 8.6 - 10.5 mg/dL    Total Protein 7.2 6.0 - 8.5 g/dL    Albumin 4.2 3.5 - 5.2 g/dL    ALT (SGPT) 28 1 - 41 U/L    AST (SGOT) 41 (H) 1 - 40 U/L    Alkaline Phosphatase 161 (H) 39 - 117 U/L    Total Bilirubin 0.7 0.0 - 1.2 mg/dL    Globulin 3.0 gm/dL    A/G Ratio 1.4 g/dL    BUN/Creatinine Ratio 10.2 7.0 - 25.0    Anion Gap 16.0 (H) 5.0 - 15.0 mmol/L    eGFR 99.7 >60.0  mL/min/1.73   Urinalysis With Microscopic If Indicated (No Culture) - Urine, Clean Catch    Specimen: Urine, Clean Catch   Result Value Ref Range    Color, UA Dark Yellow (A) Yellow, Straw    Appearance, UA Cloudy (A) Clear    pH, UA 6.0 5.0 - 8.0    Specific Gravity, UA >1.030 (H) 1.005 - 1.030    Glucose, UA Negative Negative    Ketones, UA 80 mg/dL (3+) (A) Negative    Bilirubin, UA Large (3+) (A) Negative    Blood, UA Moderate (2+) (A) Negative    Protein, UA >=300 mg/dL (3+) (A) Negative    Leuk Esterase, UA Negative Negative    Nitrite, UA Positive (A) Negative    Urobilinogen, UA 1.0 E.U./dL 0.2 - 1.0 E.U./dL   Urine Drug Screen - Urine, Clean Catch    Specimen: Urine, Clean Catch   Result Value Ref Range    THC, Screen, Urine Negative Negative    Phencyclidine (PCP), Urine Negative Negative    Cocaine Screen, Urine Negative Negative    Methamphetamine, Ur Negative Negative    Opiate Screen Negative Negative    Amphetamine Screen, Urine Negative Negative    Benzodiazepine Screen, Urine Negative Negative    Tricyclic Antidepressants Screen Negative Negative    Methadone Screen, Urine Negative Negative    Barbiturates Screen, Urine Negative Negative    Oxycodone Screen, Urine Negative Negative    Buprenorphine, Screen, Urine Negative Negative   Ethanol    Specimen: Blood   Result Value Ref Range    Ethanol <10 0 - 10 mg/dL    Ethanol % <0.010 %   CBC Auto Differential    Specimen: Blood   Result Value Ref Range    WBC 9.07 3.40 - 10.80 10*3/mm3    RBC 5.29 4.14 - 5.80 10*6/mm3    Hemoglobin 16.5 13.0 - 17.7 g/dL    Hematocrit 49.0 37.5 - 51.0 %    MCV 92.6 79.0 - 97.0 fL    MCH 31.2 26.6 - 33.0 pg    MCHC 33.7 31.5 - 35.7 g/dL    RDW 17.2 (H) 12.3 - 15.4 %    RDW-SD 56.7 (H) 37.0 - 54.0 fl    MPV 8.5 6.0 - 12.0 fL    Platelets 232 140 - 450 10*3/mm3    Neutrophil % 73.1 42.7 - 76.0 %    Lymphocyte % 14.4 (L) 19.6 - 45.3 %    Monocyte % 9.8 5.0 - 12.0 %    Eosinophil % 1.5 0.3 - 6.2 %    Basophil % 0.8 0.0 - 1.5  %    Immature Grans % 0.4 0.0 - 0.5 %    Neutrophils, Absolute 6.62 1.70 - 7.00 10*3/mm3    Lymphocytes, Absolute 1.31 0.70 - 3.10 10*3/mm3    Monocytes, Absolute 0.89 0.10 - 0.90 10*3/mm3    Eosinophils, Absolute 0.14 0.00 - 0.40 10*3/mm3    Basophils, Absolute 0.07 0.00 - 0.20 10*3/mm3    Immature Grans, Absolute 0.04 0.00 - 0.05 10*3/mm3    nRBC 0.0 0.0 - 0.2 /100 WBC   Fentanyl, Urine - Urine, Clean Catch    Specimen: Urine, Clean Catch   Result Value Ref Range    Fentanyl, Urine Negative Negative   Urinalysis, Microscopic Only - Urine, Clean Catch    Specimen: Urine, Clean Catch   Result Value Ref Range    RBC, UA 11-20 (A) None Seen, 0-2 /HPF    WBC, UA 6-10 (A) None Seen, 0-2 /HPF    Bacteria, UA Trace (A) None Seen /HPF    Squamous Epithelial Cells, UA 0-2 None Seen, 0-2 /HPF    Hyaline Casts, UA 0-2 None Seen /LPF    Sperm, UA Small/1+ (A) None Seen /HPF    Mucus, UA Large/3+ (A) None Seen, Trace /HPF    Methodology Manual Light Microscopy    [place] Urine Culture Tube - Urine, Clean Catch    Specimen: Urine, Clean Catch   Result Value Ref Range    Extra Tube Hold for add-ons.    Green Top (Gel)   Result Value Ref Range    Extra Tube Hold for add-ons.    Lavender Top   Result Value Ref Range    Extra Tube hold for add-on    Gold Top - SST   Result Value Ref Range    Extra Tube Hold for add-ons.    Light Blue Top   Result Value Ref Range    Extra Tube Hold for add-ons.      No radiology results for the last day            Medical Decision Making  58-year-old male patient presents to the emergency room today for detox from alcohol.  Patient states he has been drinking a lot.  He denies SI/HI, AVH.  Patient came from [facility].  Patient will be admitted to the detox unit.  Patient will be started on cefdinir for urinary tract infection.    Amount and/or Complexity of Data Reviewed  Labs: ordered. Decision-making details documented in ED Course.        Final diagnoses:   Alcohol abuse    Acute UTI       ED Disposition  ED Disposition       ED Disposition   DC/Transfer to Behavioral Health    Condition   Stable    Comment   --               No follow-up provider specified.       Medication List      No changes were made to your prescriptions during this visit.            Ana Serrano PA  07/01/24 [id number]

## 2024-07-02 LAB
QT INTERVAL: 372 MS
QTC INTERVAL: 460 MS

## 2024-07-02 PROCEDURE — 99222 1ST HOSP IP/OBS MODERATE 55: CPT | Performed by: PSYCHIATRY & NEUROLOGY

## 2024-07-02 PROCEDURE — G0378 HOSPITAL OBSERVATION PER HR: HCPCS

## 2024-07-02 RX ADMIN — Medication 100 MG: at 08:41

## 2024-07-02 RX ADMIN — TRAZODONE HYDROCHLORIDE 50 MG: 50 TABLET ORAL at 21:05

## 2024-07-02 RX ADMIN — CEFDINIR 300 MG: 300 CAPSULE ORAL at 21:05

## 2024-07-02 RX ADMIN — Medication 2 TABLET: at 08:41

## 2024-07-02 RX ADMIN — Medication 1 TABLET: at 08:41

## 2024-07-02 RX ADMIN — CEFDINIR 300 MG: 300 CAPSULE ORAL at 08:41

## 2024-07-02 NOTE — H&P
"      INITIAL PSYCHIATRIC HISTORY & PHYSICAL    Patient Identification:  Name:  Geovani Sarabia  Age:  58 y.o.  Sex:  male  :  1965  MRN:  [id number]   Visit Number:  [id number]  Primary Care Physician:  Provider, No Known    SUBJECTIVE    CC/Focus of Exam: alcohol use    HPI: Geovani Sarabia is a 58 y.o. male who was admitted under observation status on 2024 with complaints of alcohol use and withdrawals. The patient reports a long history of substance use. First use was at age 16 when he drank alcohol to deal with his anxiety. Over time the use increased and the patient  continued to use despite negative consequences including relationship problems, social and financial problems. The patient endorses symptoms of tolerance and withdrawals and ongoing cravings to use. Has tried to cut down and stop but has not been successful. Spends too much time and resources in pursuit of substance use. Longest period of sobriety is reported to be 4 years.  Currently using a fifth of liquor and a few beers  Last use was four days ago.   Withdrawal symptoms are not as intense today as they were two days ago.     PAST PSYCHIATRIC HX: Patient reports he has been treated for depression and anxiety in the past.     SUBSTANCE USE HX: See HPI.     SOCIAL HX:   Social History     Socioeconomic History    Marital status: Single    Number of children: 0    Highest education level: GED or equivalent   Tobacco Use    Smoking status: Every Day     Current packs/day: 0.50     Types: Cigarettes    Smokeless tobacco: Never   Vaping Use    Vaping status: Never Used   Substance and Sexual Activity    Alcohol use: Yes     Comment: see below    Drug use: Yes     Types: Other     Comment: ETOH only    Sexual activity: Not Currently         Past Medical History:   Diagnosis Date    Alcohol abuse     Anxiety     Withdrawal seizures     \"a few years ago\"    Withdrawal symptoms, alcohol           Past Surgical History:   Procedure " Laterality Date    NO PAST SURGERIES         Family History   Family history unknown: Yes         No medications prior to admission.         ALLERGIES:  Patient has no known allergies.    Temp:  [96.3 °F (35.7 °C)-98.9 °F (37.2 °C)] 97.9 °F (36.6 °C)  Heart Rate:  [] 99  Resp:  [16-20] 18  BP: (122-147)/(82-95) 139/92    REVIEW OF SYSTEMS:  Review of Systems   Constitutional:  Positive for fatigue.   HENT: Negative.     Eyes: Negative.    Respiratory: Negative.     Cardiovascular: Negative.    Gastrointestinal:  Positive for diarrhea.   Endocrine: Negative.    Genitourinary: Negative.    Musculoskeletal: Negative.    Skin: Negative.    Allergic/Immunologic: Negative.    Neurological: Negative.    Hematological: Negative.    Psychiatric/Behavioral:  The patient is nervous/anxious.         OBJECTIVE    PHYSICAL EXAM:  Physical Exam  Constitutional:  Appears well-developed and well-nourished.   HENT:   Head: Normocephalic and atraumatic.   Right Ear: External ear normal.   Left Ear: External ear normal.   Mouth/Throat: Oropharynx is clear and moist.   Eyes: Pupils are equal, round, and reactive to light. Conjunctivae and EOM are normal.   Neck: Normal range of motion. Neck supple.   Cardiovascular: Normal rate, regular rhythm and normal heart sounds.    Respiratory: Effort normal and breath sounds normal. No respiratory distress. No wheezes.   GI: Soft. Bowel sounds are normal.No distension. There is no tenderness.   Musculoskeletal: Normal range of motion. No edema or deformity.   Neurological:No cranial nerve deficit. Coordination normal.   Skin: Skin is warm and dry. No rash noted. No erythema.     MENTAL STATUS EXAM:   Hygiene:   fair  Cooperation:  Cooperative  Eye Contact:  Fair  Psychomotor Behavior:  Appropriate  Affect:  Appropriate  Hopelessness: Denies  Speech:  Normal  Thought Process: Goal directed  Thought Content:  Normal  Suicidal:  None  Homicidal:  None  Hallucinations:  None  Delusion:   None  Memory:  Intact  Orientation:  Person, Place, Time and Situation  Reliability:  fair  Insight:  Fair  Judgement:  Fair  Impulse Control:  Fair    Imaging Results (Last 24 Hours)       ** No results found for the last 24 hours. **             ECG/EMG Results (most recent)       Procedure Component Value Units Date/Time    ECG 12 Lead Other; Baseline Cardiac Status [[id number]] Collected: 07/02/24 0103     Updated: 07/02/24 1158     QT Interval 372 ms      QTC Interval 460 ms     Narrative:      Test Reason : Other~  Blood Pressure :   */*   mmHG  Vent. Rate :  92 BPM     Atrial Rate :  92 BPM     P-R Int : 138 ms          QRS Dur :  90 ms      QT Int : 372 ms       P-R-T Axes :  67  64  73 degrees     QTc Int : 460 ms    Sinus rhythm with occasional premature ventricular complexes  Otherwise normal ECG  When compared with ECG of 11-JUN-2023 19:55,  No significant change was found  Confirmed by Dany Wright ([id number]) on 7/2/2024 11:55:57 AM    Referred By:            Confirmed By: Dany Wright             Lab Results   Component Value Date    GLUCOSE 126 (H) 07/01/2024    BUN 9 07/01/2024    CREATININE 0.88 07/01/2024    BCR 10.2 07/01/2024    CO2 21.0 (L) 07/01/2024    CALCIUM 9.0 07/01/2024    ALBUMIN 4.2 07/01/2024    AST 41 (H) 07/01/2024    ALT 28 07/01/2024       Lab Results   Component Value Date    WBC 9.07 07/01/2024    HGB 16.5 07/01/2024    HCT 49.0 07/01/2024    MCV 92.6 07/01/2024     07/01/2024       Last Urine Toxicity  More data exists         Latest Ref Rng & Units 7/1/2024 6/11/2023   LAST URINE TOXICITY RESULTS   Amphetamine, Urine Qual Negative Negative  Negative    Barbiturates Screen, Urine Negative Negative  Negative    Benzodiazepine Screen, Urine Negative Negative  Negative    Buprenorphine, Screen, Urine Negative Negative  Negative    Cocaine Screen, Urine Negative Negative  Negative    Fentanyl, Urine Negative Negative  -   Methadone Screen , Urine Negative Negative  Negative     Methamphetamine, Ur Negative Negative  Negative        Brief Urine Lab Results  (Last result in the past 365 days)        Color   Clarity   Blood   Leuk Est   Nitrite   Protein   CREAT   Urine HCG        07/01/24 1806 Dark Yellow   Cloudy   Moderate (2+)   Negative   Positive   >=300 mg/dL (3+)                   Code status:  Full  Discussed code status with patient.    ASSESSMENT & PLAN:    Hospital bed: No      Alcohol use disorder, severe, dependence  -Monitor for withdrawals and treat as indicated      UTI  -Continue cefdinir      The patient has been admitted under observation status for safety and stabilization.  Patient will be monitored for withdrawals and maintained on Special Precautions Level 4 (q30 min checks).  The patient will have individual and group therapy with a master's level therapist. A master treatment plan will be developed and agreed upon by the patient and his/her treatment team.  The patient's estimated length of stay in the hospital is 3-5 days.

## 2024-07-02 NOTE — PLAN OF CARE
Goal Outcome Evaluation:  Plan of Care Reviewed With: patient  Patient Agreement with Plan of Care: agrees  Consent Given to Review Plan with: LCR  Progress: improving  Outcome Evaluation: Therapist met with Patient to review care plan, social history, and aftercare recommendations; Patient agreeable.      Problem: Adult Behavioral Health Plan of Care  Goal: Plan of Care Review  Outcome: Ongoing, Progressing  Flowsheets (Taken 7/2/2024 1548)  Consent Given to Review Plan with: LCR  Progress: improving  Plan of Care Reviewed With: patient  Patient Agreement with Plan of Care: agrees  Outcome Evaluation:   Therapist met with Patient to review care plan, social history, and aftercare recommendations   Patient agreeable.  Goal: Patient-Specific Goal (Individualization)  Outcome: Ongoing, Progressing  Flowsheets  Taken 7/2/2024 1548 by Diana Amato MSW  Patient-Specific Goals (Include Timeframe): Identify 2-3 coping skills, address relapse prevention measures, complete aftercare plans, and deny SI/HI prior to discharge.  Individualized Care Needs: Therapist to offer 1-4 therapy sessions, aftercare planning, safety planning, family education, group therapy, and brief CBT/MI interventions.  Taken 7/2/2024 1354 by Diana Amato MSW  Patient Personal Strengths:   resilient   resourceful   self-reliant  Patient Vulnerabilities:   lacks insight into illness   substance abuse/addiction   poor impulse control  Taken 7/1/2024 2051 by Daisy Solis RN  Anxieties, Fears or Concerns: None verbalized  Goal: Optimized Coping Skills in Response to Life Stressors  Outcome: Ongoing, Progressing  Flowsheets (Taken 7/2/2024 1548)  Optimized Coping Skills in Response to Life Stressors: making progress toward outcome  Intervention: Promote Effective Coping Strategies  Flowsheets (Taken 7/2/2024 1548)  Supportive Measures:   active listening utilized   counseling provided   goal-setting facilitated   verbalization of feelings  encouraged  Goal: Develops/Participates in Therapeutic Morral to Support Successful Transition  Outcome: Ongoing, Progressing  Flowsheets (Taken 7/2/2024 1548)  Develops/Participates in Therapeutic [place]ral to Support Successful Transition: making progress toward outcome  Intervention: Foster Therapeutic Morral  Flowsheets (Taken 7/2/2024 1548)  Trust Relationship/Rapport:   care explained   reassurance provided   choices provided   thoughts/feelings acknowledged   emotional support provided   empathic listening provided   questions encouraged   questions answered  Intervention: Mutually Develop Transition Plan  Flowsheets  Taken 7/2/2024 1548 by Diana Amato MSW  Outpatient/Agency/Support Group Needs: residential services  Discharge Coordination/Progress:   Therapist met with Patient to complete discharge needs assessment   Patient agreeable.  Transition Support: community resources reviewed  Transportation Anticipated: agency  Anticipated Discharge Disposition: residential substance use unit  Transportation Concerns: no car  Current Discharge Risk:   psychiatric illness   substance use/abuse  Concerns to be Addressed:   mental health   substance/tobacco abuse/use  Readmission Within the Last 30 Days: no previous admission in last 30 days  Patient's Choice of Community Agency(s): [facility]  Offered/Gave Vendor List: no  Taken 7/1/2024 2051 by Daisy Solis RN  Patient/Family Anticipated Services at Transition: rehabilitation services  Patient/Family Anticipates Transition to: ([facility]) inpatient rehabilitation facility     DATA: Therapist met individually with patient this date to introduce role and to discuss hospitalization expectations. Patient agreeable.     Patient signed consent for [facility] and plans to return there at discharge.      He declines family involvement.     Clinical Maneuvering/Intervention:     Therapist assisted patient in processing session content;  acknowledged and normalized patient’s thoughts, feelings, and concerns.  Discussed the therapist/patient relationship and explain the parameters and limitations of relative confidentiality.  Also discussed the importance of active participation, and honesty to the treatment process.  Encouraged the patient to discuss/vent their feelings, frustrations, and fears concerning their ongoing medical issues and validated their feelings.     Discussed the importance of finding enjoyable activities and coping skills that the patient can engage in a regular basis. Discussed healthy coping skills such as distraction, self love, grounding, thought challenges/reframing, etc.  Provided patient with list of healthy coping skills this date. Discussed the importance of medication compliance.  Praised the patient for seeking help and spent the majority of the session building rapport.       Allowed patient to freely discuss issues without interruption or judgment. Provided safe, confidential environment to facilitate the development of positive therapeutic relationship and encourage open, honest communication.      Therapist addressed discharge safety planning this date. Assisted patient in identifying risk factors which would indicate the need for higher level of care after discharge;  including thoughts to harm self or others and/or self-harming behavior. Encouraged patient to call 911, or present to the nearest emergency room should any of these events occur. Discussed crisis intervention services and means to access.  Encouraged securing any objects of harm.       Therapist completed integrated summary, treatment plan, and initiated social history this date.  Therapist is strongly encouraging family involvement in treatment.       ASSESSMENT: Geovani Sarabia is a 58 year old  male who states that he has been living in a hotel for the past year. Patient has a GED and works as a musician at times. He is seeking treatment  for alcohol detox. He was last here for detox treatment on 6/11/23. He states that he was able to maintain his sobriety for a while after leaving here before relapsing. He states that he is unsure of what triggered him to drink again. He reports being motivated to get sober and stay sober. His longest period of sobriety is four years. Patient plans to go to rehab at discharge. Patient was polite and cooperative with assessment.      PLAN:       Patient to remain hospitalized this date.     Treatment team will focus efforts on stabilizing patient's acute symptoms while providing education on healthy coping and crisis management to reduce hospitalizations.   Patient requires daily psychiatrist evaluation and 24/7 nursing supervision to promote patient  safety.     Therapist will offer 1-4 individual sessions, 1 therapy group daily, family education, and appropriate referral.    Therapist recommends [facility].

## 2024-07-02 NOTE — PLAN OF CARE
Problem: Adult Behavioral Health Plan of Care  Goal: Plan of Care Review  Outcome: Ongoing, Progressing  Flowsheets  Taken 7/2/2024 1802  Plan of Care Reviewed With: patient  Patient Agreement with Plan of Care: agrees  Taken 7/2/2024 0758  Plan of Care Reviewed With: patient  Patient Agreement with Plan of Care: agrees   Goal Outcome Evaluation:  Plan of Care Reviewed With: patient  Patient Agreement with Plan of Care: agrees

## 2024-07-02 NOTE — PROGRESS NOTES
Navigator is helping with the following referral:    [facility] - [phone]  -Spoke with Vashti. Patient can return at discharge.  7/2

## 2024-07-03 VITALS
OXYGEN SATURATION: 98 % | BODY MASS INDEX: 30.07 KG/M2 | DIASTOLIC BLOOD PRESSURE: 87 MMHG | HEIGHT: 68 IN | RESPIRATION RATE: 18 BRPM | HEART RATE: 87 BPM | TEMPERATURE: 97.9 F | SYSTOLIC BLOOD PRESSURE: 129 MMHG | WEIGHT: 198.4 LBS

## 2024-07-03 LAB — BACTERIA SPEC AEROBE CULT: NO GROWTH

## 2024-07-03 PROCEDURE — 99238 HOSP IP/OBS DSCHRG MGMT 30/<: CPT | Performed by: PSYCHIATRY & NEUROLOGY

## 2024-07-03 PROCEDURE — G0378 HOSPITAL OBSERVATION PER HR: HCPCS

## 2024-07-03 RX ORDER — CEFDINIR 300 MG/1
300 CAPSULE ORAL EVERY 12 HOURS SCHEDULED
Qty: 10 CAPSULE | Refills: 0 | Status: SHIPPED | OUTPATIENT
Start: 2024-07-03 | End: 2024-07-08

## 2024-07-03 RX ADMIN — Medication 2 TABLET: at 08:08

## 2024-07-03 RX ADMIN — CEFDINIR 300 MG: 300 CAPSULE ORAL at 08:08

## 2024-07-03 RX ADMIN — Medication 100 MG: at 08:08

## 2024-07-03 RX ADMIN — Medication 1 TABLET: at 08:08

## 2024-07-03 NOTE — PROGRESS NOTES
Navigator is helping with the following referral:     [facility] - [phone]  -Spoke with Vashti. Patient can return at discharge.  7/2  -Spoke with Arlyn. They will pick patient up later today.  7/3

## 2024-07-03 NOTE — PROGRESS NOTES
Discharge Planning Assessment  [facility]     Patient Name: Geovani Sarabia  MRN: [id number]  Today's Date: 7/3/2024    Admit Date: 7/1/2024    Patient is being discharged today. He plans to return to [facility]. [facility] to pick Patient up later today.     JOSELINE Newell

## 2024-07-03 NOTE — DISCHARGE SUMMARY
":  1965  MRN:  [id number]  Visit Number:  [id number]      Date of Admission:2024   Date of Discharge:  7/3/2024    Discharge Diagnosis:  Principal Problem:    Alcohol use disorder, severe, dependence    UTI      Admission Diagnosis:  Alcohol abuse [F10.10]     HPI  Geovani Sarabia is a 58 y.o. male who was admitted under observation status on 2024 with complaints of alcohol use and withdrawals.    For details please see H&P dated 24    Hospital Course  Patient is a 58 y.o. male presented with a history of alcohol use and was admitted to the detox recovery unit under observation status as he was not experiencing severe withdrawals. The patient was monitored for two day but he didn't experience or exhibit any active withdrawals and reported feeling better with improved sleep and appetite. He reported his last drink was more than five days ago and it appeared he didn't need to start detox treatment and was discharged to residential rehab.  The patient was started on cefdinir for UTI and was given a script at discharge.      Mental Status Exam upon discharge:   Mood \"good\"   Affect-congruent, appropriate, stable  Thought Content-goal directed, no delusional material present  Thought process-linear, organized.  Suicidality: No SI  Homicidality: No HI  Perception: No /    Procedures Performed         Consults:   Consults       No orders found from 2024 to 2024.            Pertinent Test Results:   Admission on 2024   Component Date Value Ref Range Status    QT Interval 2024 372  ms Final    QTC Interval 2024 460  ms Final   Admission on 2024, Discharged on 2024   Component Date Value Ref Range Status    Glucose 2024 126 (H)  65 - 99 mg/dL Final    BUN 2024 9  6 - 20 mg/dL Final    Creatinine 2024 0.88  0.76 - 1.27 mg/dL Final    Sodium 2024 136  136 - 145 mmol/L Final    Potassium 2024 3.8  3.5 - 5.2 mmol/L Final    Slight " hemolysis detected by analyzer. Result may be falsely elevated.    Chloride 07/01/2024 99  98 - 107 mmol/L Final    CO2 07/01/2024 21.0 (L)  22.0 - 29.0 mmol/L Final    Calcium 07/01/2024 9.0  8.6 - 10.5 mg/dL Final    Total Protein 07/01/2024 7.2  6.0 - 8.5 g/dL Final    Albumin 07/01/2024 4.2  3.5 - 5.2 g/dL Final    ALT (SGPT) 07/01/2024 28  1 - 41 U/L Final    AST (SGOT) 07/01/2024 41 (H)  1 - 40 U/L Final    Alkaline Phosphatase 07/01/2024 161 (H)  39 - 117 U/L Final    Total Bilirubin 07/01/2024 0.7  0.0 - 1.2 mg/dL Final    Globulin 07/01/2024 3.0  gm/dL Final    A/G Ratio 07/01/2024 1.4  g/dL Final    BUN/Creatinine Ratio 07/01/2024 10.2  7.0 - 25.0 Final    Anion Gap 07/01/2024 16.0 (H)  5.0 - 15.0 mmol/L Final    eGFR 07/01/2024 99.7  >60.0 mL/min/1.73 Final    Color, UA 07/01/2024 Dark Yellow (A)  Yellow, Straw Final    Appearance, UA 07/01/2024 Cloudy (A)  Clear Final    pH, UA 07/01/2024 6.0  5.0 - 8.0 Final    Specific Gravity, UA 07/01/2024 >1.030 (H)  1.005 - 1.030 Final    Glucose, UA 07/01/2024 Negative  Negative Final    Ketones, UA 07/01/2024 80 mg/dL (3+) (A)  Negative Final    Bilirubin, UA 07/01/2024 Large (3+) (A)  Negative Final    Blood, UA 07/01/2024 Moderate (2+) (A)  Negative Final    Protein, UA 07/01/2024 >=300 mg/dL (3+) (A)  Negative Final    Leuk Esterase, UA 07/01/2024 Negative  Negative Final    Nitrite, UA 07/01/2024 Positive (A)  Negative Final    Urobilinogen, UA 07/01/2024 1.0 E.U./dL  0.2 - 1.0 E.U./dL Final    THC, Screen, Urine 07/01/2024 Negative  Negative Final    Phencyclidine (PCP), Urine 07/01/2024 Negative  Negative Final    Cocaine Screen, Urine 07/01/2024 Negative  Negative Final    Methamphetamine, Ur 07/01/2024 Negative  Negative Final    Opiate Screen 07/01/2024 Negative  Negative Final    Amphetamine Screen, Urine 07/01/2024 Negative  Negative Final    Benzodiazepine Screen, Urine 07/01/2024 Negative  Negative Final    Tricyclic Antidepressants Screen 07/01/2024  Negative  Negative Final    Methadone Screen, Urine 07/01/2024 Negative  Negative Final    Barbiturates Screen, Urine 07/01/2024 Negative  Negative Final    Oxycodone Screen, Urine 07/01/2024 Negative  Negative Final    Buprenorphine, Screen, Urine 07/01/2024 Negative  Negative Final    Ethanol 07/01/2024 <10  0 - 10 mg/dL Final    Ethanol % 07/01/2024 <0.010  % Final    WBC 07/01/2024 9.07  3.40 - 10.80 10*3/mm3 Final    RBC 07/01/2024 5.29  4.14 - 5.80 10*6/mm3 Final    Hemoglobin 07/01/2024 16.5  13.0 - 17.7 g/dL Final    Hematocrit 07/01/2024 49.0  37.5 - 51.0 % Final    MCV 07/01/2024 92.6  79.0 - 97.0 fL Final    MCH 07/01/2024 31.2  26.6 - 33.0 pg Final    MCHC 07/01/2024 33.7  31.5 - 35.7 g/dL Final    RDW 07/01/2024 17.2 (H)  12.3 - 15.4 % Final    RDW-SD 07/01/2024 56.7 (H)  37.0 - 54.0 fl Final    MPV 07/01/2024 8.5  6.0 - 12.0 fL Final    Platelets 07/01/2024 232  140 - 450 10*3/mm3 Final    Neutrophil % 07/01/2024 73.1  42.7 - 76.0 % Final    Lymphocyte % 07/01/2024 14.4 (L)  19.6 - 45.3 % Final    Monocyte % 07/01/2024 9.8  5.0 - 12.0 % Final    Eosinophil % 07/01/2024 1.5  0.3 - 6.2 % Final    Basophil % 07/01/2024 0.8  0.0 - 1.5 % Final    Immature Grans % 07/01/2024 0.4  0.0 - 0.5 % Final    Neutrophils, Absolute 07/01/2024 6.62  1.70 - 7.00 10*3/mm3 Final    Lymphocytes, Absolute 07/01/2024 1.31  0.70 - 3.10 10*3/mm3 Final    Monocytes, Absolute 07/01/2024 0.89  0.10 - 0.90 10*3/mm3 Final    Eosinophils, Absolute 07/01/2024 0.14  0.00 - 0.40 10*3/mm3 Final    Basophils, Absolute 07/01/2024 0.07  0.00 - 0.20 10*3/mm3 Final    Immature Grans, Absolute 07/01/2024 0.04  0.00 - 0.05 10*3/mm3 Final    nRBC 07/01/2024 0.0  0.0 - 0.2 /100 WBC Final    Extra Tube 07/01/2024 Hold for add-ons.   Final    Auto resulted.    Extra Tube 07/01/2024 hold for add-on   Final    Auto resulted    Extra Tube 07/01/2024 Hold for add-ons.   Final    Auto resulted.    Extra Tube 07/01/2024 Hold for add-ons.   Final    Auto  resulted    Fentanyl, Urine 07/01/2024 Negative  Negative Final    RBC, UA 07/01/2024 11-20 (A)  None Seen, 0-2 /HPF Final    WBC, UA 07/01/2024 6-10 (A)  None Seen, 0-2 /HPF Final    Bacteria, UA 07/01/2024 Trace (A)  None Seen /HPF Final    Squamous Epithelial Cells, UA 07/01/2024 0-2  None Seen, 0-2 /HPF Final    Hyaline Casts, UA 07/01/2024 0-2  None Seen /LPF Final    Sperm, UA 07/01/2024 Small/1+ (A)  None Seen /HPF Final    Mucus, UA 07/01/2024 Large/3+ (A)  None Seen, Trace /HPF Final    Methodology 07/01/2024 Manual Light Microscopy   Final    Extra Tube 07/01/2024 Hold for add-ons.   Final    Auto resulted.    Urine Culture 07/01/2024 No growth   Final        Condition on Discharge:  improved    Vital Signs  Temp:  [96.3 °F (35.7 °C)-98.4 °F (36.9 °C)] 97.1 °F (36.2 °C)  Heart Rate:  [] 110  Resp:  [16-20] 16  BP: (117-142)/(83-92) 131/87      Discharge Disposition:  Rehab Facility or Unit (DC - External)    Discharge Medications:     Discharge Medications        New Medications        Instructions Start Date   cefdinir 300 MG capsule  Commonly known as: OMNICEF   300 mg, Oral, Every 12 Hours Scheduled               Discharge Diet: Regular     Activity at Discharge: As tolerated.      Follow-up Appointments  [facility]      Time: I spent  < 30  minutes on this discharge activity which included: face-to-face encounter with the patient, reviewing the data in the system, coordination of the care with the nursing staff as well as consultants, documentation, and entering orders.        Clinician:   Stefani Bello MD  07/03/24  11:16 EDT

## 2024-07-03 NOTE — PLAN OF CARE
"Goal Outcome Evaluation:  Plan of Care Reviewed With: patient  Patient Agreement with Plan of Care: agrees     Progress: improving  Outcome Evaluation: Pt reports feeling \"a little bit better.\" Pt reports good appetite and fair sleep. Pt rates anxiety/depression 8/10 and craving 7/10. Pt denies SI/HI/AVH.    Pt appeared to sleep throughout the night, approximately 7 hours this shift.                           "

## 2024-07-03 NOTE — PLAN OF CARE
Goal Outcome Evaluation:  Plan of Care Reviewed With: patient  Patient Agreement with Plan of Care: agrees                  PT DISCHARGING TODAY